# Patient Record
Sex: MALE | Race: WHITE | NOT HISPANIC OR LATINO
[De-identification: names, ages, dates, MRNs, and addresses within clinical notes are randomized per-mention and may not be internally consistent; named-entity substitution may affect disease eponyms.]

---

## 2021-05-26 ENCOUNTER — RESULT REVIEW (OUTPATIENT)
Age: 61
End: 2021-05-26

## 2022-05-05 PROBLEM — Z00.00 ENCOUNTER FOR PREVENTIVE HEALTH EXAMINATION: Status: ACTIVE | Noted: 2022-05-05

## 2022-06-09 ENCOUNTER — APPOINTMENT (OUTPATIENT)
Dept: ORTHOPEDIC SURGERY | Facility: CLINIC | Age: 62
End: 2022-06-09

## 2022-06-22 ENCOUNTER — APPOINTMENT (OUTPATIENT)
Dept: ORTHOPEDIC SURGERY | Facility: CLINIC | Age: 62
End: 2022-06-22
Payer: COMMERCIAL

## 2022-06-22 VITALS — BODY MASS INDEX: 28.88 KG/M2 | WEIGHT: 195 LBS | HEIGHT: 69 IN

## 2022-06-22 DIAGNOSIS — I51.9 HEART DISEASE, UNSPECIFIED: ICD-10-CM

## 2022-06-22 PROCEDURE — 72170 X-RAY EXAM OF PELVIS: CPT

## 2022-06-22 PROCEDURE — 72110 X-RAY EXAM L-2 SPINE 4/>VWS: CPT

## 2022-06-22 PROCEDURE — 99204 OFFICE O/P NEW MOD 45 MIN: CPT

## 2022-06-22 NOTE — HISTORY OF PRESENT ILLNESS
[Lower back] : lower back [Gradual] : gradual [8] : 8 [7] : 7 [Localized] : localized [Constant] : constant [Household chores] : household chores [Nothing helps with pain getting better] : Nothing helps with pain getting better [Standing] : standing [Walking] : walking [Stairs] : stairs [Lying in bed] : lying in bed [de-identified] : 6/22/22- 62 y/o LHD M presents for lower back pain for years, as well as R groin pain. Denies specific injury. Aggravated by turning over in bed and prolonged sitting/standing/walking. \par \par Denies b/b incontinence.\par Associated pain radiating down posterolateral RLE to ankle. Associated RLE weakness. \par \par Had rigth hip arthroscopy and injectoins without much relief \par \par Has tried physical therapy 2 months ago and 4 LESIs several years ago, without relief. \par Denies prior back/hip surgeries. 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

## 2022-06-22 NOTE — PHYSICAL EXAM
[] : positive equivocal straight leg raise [Right] : right hip [de-identified] : shooting lateral thigh  [FreeTextEntry3] : pain with rotation of the right hip

## 2022-06-22 NOTE — DISCUSSION/SUMMARY
[de-identified] : appears to have grade 1 spondylolisthesis -also with right hip issues - has had hip ATS and without much relief \par \par flexeril \par \par referral to pain for L5-s1 lesi \par \par not interested in surgery - would be a candidate surgery if his heart was okay - but not likely with an ejection fraction in the 20s \par would need cardiac clearance

## 2022-06-22 NOTE — IMAGING
[Facet arthropathy] : Facet arthropathy [Spondylolithesis] : Spondylolithesis [de-identified] : L spine\par Palpation: midline lumbar tenderness and R paraspinal/piriformis tenderness\par ROM: diminished all planes\par Strength: 5/5 throughout RLE.4+/5 throughout LLE\par Sensation: diminished sensation to light touch of RLE above and below the knee compared to LLE\par R groin pain with R hip flex/IR [FreeTextEntry1] : L5-S1 spondylolysthesis

## 2022-06-30 ENCOUNTER — FORM ENCOUNTER (OUTPATIENT)
Age: 62
End: 2022-06-30

## 2022-07-11 ENCOUNTER — FORM ENCOUNTER (OUTPATIENT)
Age: 62
End: 2022-07-11

## 2022-07-11 ENCOUNTER — APPOINTMENT (OUTPATIENT)
Dept: PAIN MANAGEMENT | Facility: CLINIC | Age: 62
End: 2022-07-11

## 2022-07-11 VITALS — BODY MASS INDEX: 29.03 KG/M2 | WEIGHT: 196 LBS | HEIGHT: 69 IN

## 2022-07-11 DIAGNOSIS — M25.551 PAIN IN RIGHT HIP: ICD-10-CM

## 2022-07-11 PROCEDURE — 99204 OFFICE O/P NEW MOD 45 MIN: CPT

## 2022-07-11 RX ORDER — RAMIPRIL 5 MG/1
5 CAPSULE ORAL
Qty: 90 | Refills: 0 | Status: ACTIVE | COMMUNITY
Start: 2022-05-27

## 2022-07-11 RX ORDER — LAMOTRIGINE 25 MG/1
25 TABLET ORAL
Qty: 180 | Refills: 0 | Status: ACTIVE | COMMUNITY
Start: 2022-04-27

## 2022-07-11 RX ORDER — METOPROLOL TARTRATE 50 MG/1
50 TABLET, FILM COATED ORAL
Qty: 180 | Refills: 0 | Status: ACTIVE | COMMUNITY
Start: 2022-04-27

## 2022-07-11 RX ORDER — CLOPIDOGREL BISULFATE 75 MG/1
75 TABLET, FILM COATED ORAL
Qty: 90 | Refills: 0 | Status: ACTIVE | COMMUNITY
Start: 2022-06-14

## 2022-07-11 NOTE — DATA REVIEWED
[CT Scan] : CT scan [Lumbar Spine] : lumbar spine [Report was reviewed and noted in the chart] : The report was reviewed and noted in the chart [I independently reviewed and interpreted images and report] : I independently reviewed and interpreted images and report [I reviewed the films/CD and agree] : I reviewed the films/CD and agree

## 2022-07-11 NOTE — HISTORY OF PRESENT ILLNESS
[Lower back] : lower back [10] : 10 [7] : 7 [Shooting] : shooting [Stabbing] : stabbing [Constant] : constant [Rest] : rest [Sitting] : sitting [Standing] : standing [Walking] : walking [Bending forward] : bending forward [] : no [FreeTextEntry1] : Right hip  [FreeTextEntry7] : right leg and groin  [de-identified] : Xray [facility]

## 2022-07-11 NOTE — ASSESSMENT
[FreeTextEntry1] : After discussing various treatment options with the patient including but not limited to oral medications, physical therapy, exercise, modalities as well as interventional spinal injections, we have decided with the following plan:\par \par 1) Intervention Injection Therapy:\par I personally reviewed the MRI/CT scan images and agree with the radiologist's report. The radiological findings were discussed with the patient.\par The risks, benefits, contents and alternatives to injection were explained in full to the patient. Risks outlined include but are not limited to infection,sepsis, bleeding, post-dural puncture headache, nerve damage, temporary increase in pain, syncopal episode, failure to resolve symptoms, allergic reaction, symptom recurrence, and elevation of blood sugar in diabetics. Cortisone may cause immunosuppression. Patient understands the risks. All questions were answered. After discussion of options, patient requested an injection. Information regarding the injection was given to the patient. Which medications to stop prior to the injection was explained to the patient as well.\par \par Follow up in 1-2 weeks post injection for re-evaluation. \par Continue Home exercises, stretching, activity modification, physical therapy, and conservative care.\par \par Patient is presenting with acute/sub-acute radicular pain with impairment in ADLs and functionality.  The pain has not responded to  conservative care including nsaid therapy and/or physical therapy.  There is no bleeding tendency, unstable medical condition, or systemic infection.\par \par right L5/S1 TFESI\par \par \par \par \par

## 2022-07-11 NOTE — PHYSICAL EXAM
[] : lumbar paraspinal tenderness [3___] : left hip flexion 3[unfilled]/5 [4___] : right hip flexion 4[unfilled]/5 [TWNoteComboBox7] : forward flexion 30 degrees [de-identified] : extension 10 degrees

## 2022-09-13 LAB — SARS-COV-2 N GENE NPH QL NAA+PROBE: NOT DETECTED

## 2022-09-15 ENCOUNTER — APPOINTMENT (OUTPATIENT)
Age: 62
End: 2022-09-15

## 2022-09-15 PROCEDURE — 64483 NJX AA&/STRD TFRM EPI L/S 1: CPT | Mod: RT

## 2022-11-07 ENCOUNTER — NON-APPOINTMENT (OUTPATIENT)
Age: 62
End: 2022-11-07

## 2022-11-20 ENCOUNTER — EMERGENCY (EMERGENCY)
Facility: HOSPITAL | Age: 62
LOS: 1 days | Discharge: HOME | End: 2022-11-20
Attending: EMERGENCY MEDICINE | Admitting: EMERGENCY MEDICINE
Payer: COMMERCIAL

## 2022-11-20 VITALS
HEART RATE: 52 BPM | RESPIRATION RATE: 18 BRPM | SYSTOLIC BLOOD PRESSURE: 126 MMHG | DIASTOLIC BLOOD PRESSURE: 78 MMHG | OXYGEN SATURATION: 98 % | WEIGHT: 197.98 LBS | HEIGHT: 69 IN | TEMPERATURE: 97 F

## 2022-11-20 VITALS
HEART RATE: 65 BPM | TEMPERATURE: 98 F | SYSTOLIC BLOOD PRESSURE: 132 MMHG | OXYGEN SATURATION: 99 % | DIASTOLIC BLOOD PRESSURE: 87 MMHG | RESPIRATION RATE: 18 BRPM

## 2022-11-20 LAB
ALBUMIN SERPL ELPH-MCNC: 3.4 G/DL — SIGNIFICANT CHANGE UP (ref 3.3–5)
ALP SERPL-CCNC: 69 U/L — SIGNIFICANT CHANGE UP (ref 40–120)
ALT FLD-CCNC: 19 U/L — SIGNIFICANT CHANGE UP (ref 12–78)
ANION GAP SERPL CALC-SCNC: 7 MMOL/L — SIGNIFICANT CHANGE UP (ref 5–17)
AST SERPL-CCNC: 12 U/L — LOW (ref 15–37)
BASOPHILS # BLD AUTO: 0.09 K/UL — SIGNIFICANT CHANGE UP (ref 0–0.2)
BASOPHILS NFR BLD AUTO: 1.2 % — SIGNIFICANT CHANGE UP (ref 0–2)
BILIRUB SERPL-MCNC: 0.3 MG/DL — SIGNIFICANT CHANGE UP (ref 0.2–1.2)
BUN SERPL-MCNC: 20 MG/DL — SIGNIFICANT CHANGE UP (ref 7–23)
CALCIUM SERPL-MCNC: 9.1 MG/DL — SIGNIFICANT CHANGE UP (ref 8.5–10.1)
CHLORIDE SERPL-SCNC: 110 MMOL/L — HIGH (ref 96–108)
CO2 SERPL-SCNC: 24 MMOL/L — SIGNIFICANT CHANGE UP (ref 22–31)
CREAT SERPL-MCNC: 0.98 MG/DL — SIGNIFICANT CHANGE UP (ref 0.5–1.3)
EGFR: 88 ML/MIN/1.73M2 — SIGNIFICANT CHANGE UP
EOSINOPHIL # BLD AUTO: 0.54 K/UL — HIGH (ref 0–0.5)
EOSINOPHIL NFR BLD AUTO: 6.9 % — HIGH (ref 0–6)
GLUCOSE SERPL-MCNC: 83 MG/DL — SIGNIFICANT CHANGE UP (ref 70–99)
HCT VFR BLD CALC: 38.3 % — LOW (ref 39–50)
HGB BLD-MCNC: 12.6 G/DL — LOW (ref 13–17)
IMM GRANULOCYTES NFR BLD AUTO: 0.5 % — SIGNIFICANT CHANGE UP (ref 0–0.9)
LYMPHOCYTES # BLD AUTO: 2.48 K/UL — SIGNIFICANT CHANGE UP (ref 1–3.3)
LYMPHOCYTES # BLD AUTO: 31.8 % — SIGNIFICANT CHANGE UP (ref 13–44)
MCHC RBC-ENTMCNC: 28 PG — SIGNIFICANT CHANGE UP (ref 27–34)
MCHC RBC-ENTMCNC: 32.9 GM/DL — SIGNIFICANT CHANGE UP (ref 32–36)
MCV RBC AUTO: 85.1 FL — SIGNIFICANT CHANGE UP (ref 80–100)
MONOCYTES # BLD AUTO: 0.64 K/UL — SIGNIFICANT CHANGE UP (ref 0–0.9)
MONOCYTES NFR BLD AUTO: 8.2 % — SIGNIFICANT CHANGE UP (ref 2–14)
NEUTROPHILS # BLD AUTO: 4.01 K/UL — SIGNIFICANT CHANGE UP (ref 1.8–7.4)
NEUTROPHILS NFR BLD AUTO: 51.4 % — SIGNIFICANT CHANGE UP (ref 43–77)
NRBC # BLD: 0 /100 WBCS — SIGNIFICANT CHANGE UP (ref 0–0)
PLATELET # BLD AUTO: 340 K/UL — SIGNIFICANT CHANGE UP (ref 150–400)
POTASSIUM SERPL-MCNC: 4 MMOL/L — SIGNIFICANT CHANGE UP (ref 3.5–5.3)
POTASSIUM SERPL-SCNC: 4 MMOL/L — SIGNIFICANT CHANGE UP (ref 3.5–5.3)
PROT SERPL-MCNC: 7.7 G/DL — SIGNIFICANT CHANGE UP (ref 6–8.3)
RBC # BLD: 4.5 M/UL — SIGNIFICANT CHANGE UP (ref 4.2–5.8)
RBC # FLD: 15.1 % — HIGH (ref 10.3–14.5)
SODIUM SERPL-SCNC: 141 MMOL/L — SIGNIFICANT CHANGE UP (ref 135–145)
TROPONIN I, HIGH SENSITIVITY RESULT: 10.5 NG/L — SIGNIFICANT CHANGE UP
WBC # BLD: 7.8 K/UL — SIGNIFICANT CHANGE UP (ref 3.8–10.5)
WBC # FLD AUTO: 7.8 K/UL — SIGNIFICANT CHANGE UP (ref 3.8–10.5)

## 2022-11-20 PROCEDURE — 70450 CT HEAD/BRAIN W/O DYE: CPT | Mod: 26,MG

## 2022-11-20 PROCEDURE — 99285 EMERGENCY DEPT VISIT HI MDM: CPT

## 2022-11-20 PROCEDURE — G1004: CPT

## 2022-11-20 PROCEDURE — 71045 X-RAY EXAM CHEST 1 VIEW: CPT | Mod: 26

## 2022-11-20 PROCEDURE — 36415 COLL VENOUS BLD VENIPUNCTURE: CPT

## 2022-11-20 PROCEDURE — 80053 COMPREHEN METABOLIC PANEL: CPT

## 2022-11-20 PROCEDURE — 93005 ELECTROCARDIOGRAM TRACING: CPT

## 2022-11-20 PROCEDURE — 93010 ELECTROCARDIOGRAM REPORT: CPT

## 2022-11-20 PROCEDURE — 71045 X-RAY EXAM CHEST 1 VIEW: CPT

## 2022-11-20 PROCEDURE — 84484 ASSAY OF TROPONIN QUANT: CPT

## 2022-11-20 PROCEDURE — 85025 COMPLETE CBC W/AUTO DIFF WBC: CPT

## 2022-11-20 PROCEDURE — 70450 CT HEAD/BRAIN W/O DYE: CPT | Mod: MG

## 2022-11-20 PROCEDURE — 99285 EMERGENCY DEPT VISIT HI MDM: CPT | Mod: 25

## 2022-11-20 RX ORDER — MECLIZINE HCL 12.5 MG
25 TABLET ORAL ONCE
Refills: 0 | Status: COMPLETED | OUTPATIENT
Start: 2022-11-20 | End: 2022-11-20

## 2022-11-20 RX ORDER — ONDANSETRON 8 MG/1
4 TABLET, FILM COATED ORAL ONCE
Refills: 0 | Status: COMPLETED | OUTPATIENT
Start: 2022-11-20 | End: 2022-11-20

## 2022-11-20 RX ORDER — MECLIZINE HCL 12.5 MG
1 TABLET ORAL
Qty: 12 | Refills: 0
Start: 2022-11-20 | End: 2022-11-22

## 2022-11-20 RX ORDER — MECLIZINE HCL 12.5 MG
25 TABLET ORAL ONCE
Refills: 0 | Status: DISCONTINUED | OUTPATIENT
Start: 2022-11-20 | End: 2022-11-24

## 2022-11-20 RX ORDER — ACETAMINOPHEN 500 MG
650 TABLET ORAL ONCE
Refills: 0 | Status: COMPLETED | OUTPATIENT
Start: 2022-11-20 | End: 2022-11-20

## 2022-11-20 RX ORDER — CIPROFLOXACIN AND DEXAMETHASONE 3; 1 MG/ML; MG/ML
4 SUSPENSION/ DROPS AURICULAR (OTIC)
Qty: 7.5 | Refills: 0
Start: 2022-11-20 | End: 2022-11-26

## 2022-11-20 RX ORDER — SODIUM CHLORIDE 9 MG/ML
1000 INJECTION INTRAMUSCULAR; INTRAVENOUS; SUBCUTANEOUS ONCE
Refills: 0 | Status: COMPLETED | OUTPATIENT
Start: 2022-11-20 | End: 2022-11-20

## 2022-11-20 RX ADMIN — Medication 650 MILLIGRAM(S): at 19:31

## 2022-11-20 RX ADMIN — Medication 25 MILLIGRAM(S): at 19:30

## 2022-11-20 NOTE — ED PROVIDER NOTE - NEURO NEGATIVE STATEMENT, MLM
no loss of consciousness, no gait abnormality, no headache, no sensory deficits, and no weakness. ++ spinning, vertiginous dizziness.

## 2022-11-20 NOTE — ED PROVIDER NOTE - NSFOLLOWUPINSTRUCTIONS_ED_ALL_ED_FT
Vertigo      Vertigo is the feeling that you or the things around you are moving when they are not. This feeling can come and go at any time. Vertigo often goes away on its own. This condition can be dangerous if it happens when you are doing activities like driving or working with machines.    Your doctor will do tests to find the cause of your vertigo. These tests will also help your doctor decide on the best treatment for you.      Follow these instructions at home:      Eating and drinking       A comparison of three sample cups showing dark yellow, yellow, and pale yellow urine.       A sign showing that a person should not drink beer, wine, or liquor.     •Drink enough fluid to keep your pee (urine) pale yellow.      • Do not drink alcohol.      Activity     •Return to your normal activities when your doctor says that it is safe.      •In the morning, first sit up on the side of the bed. When you feel okay, stand slowly while you hold onto something until you know that your balance is fine.      •Move slowly. Avoid sudden body or head movements or certain positions, as told by your doctor.      •Use a cane if you have trouble standing or walking.      •Sit down right away if you feel dizzy.      •Avoid doing any tasks or activities that can cause danger to you or others if you get dizzy.      •Avoid bending down if you feel dizzy. Place items in your home so that they are easy for you to reach without bending or leaning over.      • Do not drive or use machinery if you feel dizzy.      General instructions     •Take over-the-counter and prescription medicines only as told by your doctor.      •Keep all follow-up visits.        Contact a doctor if:    •Your medicine does not help your vertigo.      •Your problems get worse or you have new symptoms.      •You have a fever.      •You feel like you may vomit (nauseous), or this feeling gets worse.      •You start to vomit.      •Your family or friends see changes in how you act.      •You lose feeling (have numbness) in part of your body.      •You feel prickling and tingling in a part of your body.        Get help right away if:    •You are always dizzy.      •You faint.      •You get very bad headaches.      •You get a stiff neck.      •Bright light starts to bother you.      •You have trouble moving or talking.      •You feel weak in your hands, arms, or legs.      •You have changes in your hearing or in how you see (vision).      These symptoms may be an emergency. Get help right away. Call your local emergency services (911 in the [place]   • Do not wait to see if the symptoms will go away.        • Do not drive yourself to the hospital.         Summary    •Vertigo is the feeling that you or the things around you are moving when they are not.      •Your doctor will do tests to find the cause of your vertigo.      •You may be told to avoid some tasks, positions, or movements.      •Contact a doctor if your medicine is not helping, or if you have a fever, new symptoms, or a change in how you act.      •Get help right away if you get very bad headaches, or if you have changes in how you speak, hear, or see.      This information is not intended to replace advice given to you by your health care provider. Make sure you discuss any questions you have with your health care provider.      Document Revised: 11/17/2021 Document Reviewed: 11/17/2021    Elsevier Patient Education © 2022 [employer]

## 2022-11-20 NOTE — ED PROVIDER NOTE - ATTENDING APP SHARED VISIT CONTRIBUTION OF CARE
62-year-old male past medical history of MI in 2004 requiring 1 cardiac stent currently on Plavix, aspirin 81 mg, hypertension metoprolol 50 mg and Lamictal, low back pain herniated disks, presents to ER complaining of dizziness.  Patient states that Friday night he went out to dinner and had fish and then started to feel abdominal cramping and nausea in the night, states that he had woken up feeling dizziness, room spinning and continue like that throughout the day on Saturday and today.  Patient states his symptoms are worse when he gets up and turns his head and feels better when he lays down.  Patient awake and alert no acute distress, no facial droop, no focal weakness, coordination intact, able to ambulate without assistance, heart and lungs clear, abdomen soft nontender, no nystagmus, PERRL, EOMI, follow-up CT head, labs, EKG, meclizine, Zofran and reevaluate

## 2022-11-20 NOTE — ED PROVIDER NOTE - PATIENT PORTAL LINK FT
You can access the FollowMyHealth Patient Portal offered by [facility] by registering at the following website: [email or web address]. By joining [facility]’s FollowMyHealth portal, you will also be able to view your health information using other applications (apps) compatible with our system.

## 2022-11-20 NOTE — ED PROVIDER NOTE - ENMT, MLM
Airway patent, Nasal mucosa clear. Mouth with normal mucosa. Throat has no vesicles, no oropharyngeal exudates and uvula is midline. Right ear with otitis externa. Left ear, TM intact.

## 2022-11-20 NOTE — ED ADULT NURSE NOTE - OBJECTIVE STATEMENT
There is fluid in your ear may be as a result of an allergic reaction due to your recent exposure to cats.  This can impair your hearing.    Take medications as prescribed to try to treat the symptoms.    If symptoms persist Monday please call the ENT for the next available appointment   patient comes to ED for evaluation of dizziness patient reports he awoke early this AM he awoke and felt dizzy when he stood up also complains of headache 5/10 denies nausea or vomiting denies head injury no hx of vertigo

## 2022-11-20 NOTE — ED PROVIDER NOTE - OBJECTIVE STATEMENT
61 yo male with PMHx HTN, MI, stents, present to ED c/o spinning dizziness that began Saturday AM. Patient states that Friday night he went out to dinner and ate fish that he believed to be spoiled. Patient had abdominal cramping and nausea in the night.  The next morning he woke with dizziness with associated nausea, no vomiting. Dizziness is worse with movement of head. Relived by rest/time.   Denies current HA, ++ vertiginous type dizziness. Denies weakness.   Denies fever, chills.   Denies sore throat, cough.  Denies CP, denies SOB.   Denies palpitations.   Denies current abdominal pain, ++ nauseas,  Denies v/d/c.    Denies muscle pain, joint pain.   Denies unilateral weakness, numbness or tingling.

## 2022-11-23 ENCOUNTER — APPOINTMENT (OUTPATIENT)
Dept: OTOLARYNGOLOGY | Facility: CLINIC | Age: 62
End: 2022-11-23

## 2022-11-23 VITALS — BODY MASS INDEX: 29.33 KG/M2 | HEIGHT: 69 IN | WEIGHT: 198 LBS

## 2022-11-23 DIAGNOSIS — F17.200 NICOTINE DEPENDENCE, UNSPECIFIED, UNCOMPLICATED: ICD-10-CM

## 2022-11-23 DIAGNOSIS — R40.0 SOMNOLENCE: ICD-10-CM

## 2022-11-23 DIAGNOSIS — I25.2 OLD MYOCARDIAL INFARCTION: ICD-10-CM

## 2022-11-23 DIAGNOSIS — R42 DIZZINESS AND GIDDINESS: ICD-10-CM

## 2022-11-23 DIAGNOSIS — Z86.39 PERSONAL HISTORY OF OTHER ENDOCRINE, NUTRITIONAL AND METABOLIC DISEASE: ICD-10-CM

## 2022-11-23 DIAGNOSIS — H93.13 TINNITUS, BILATERAL: ICD-10-CM

## 2022-11-23 DIAGNOSIS — H69.83 OTHER SPECIFIED DISORDERS OF EUSTACHIAN TUBE, BILATERAL: ICD-10-CM

## 2022-11-23 PROCEDURE — 99204 OFFICE O/P NEW MOD 45 MIN: CPT

## 2022-11-23 PROCEDURE — 92550 TYMPANOMETRY & REFLEX THRESH: CPT

## 2022-11-23 PROCEDURE — 92588 EVOKED AUDITORY TST COMPLETE: CPT

## 2022-11-23 PROCEDURE — 92557 COMPREHENSIVE HEARING TEST: CPT

## 2022-11-23 RX ORDER — IBUPROFEN 600 MG/1
600 TABLET, FILM COATED ORAL
Qty: 42 | Refills: 0 | Status: DISCONTINUED | COMMUNITY
Start: 2022-01-17 | End: 2022-11-23

## 2022-11-23 RX ORDER — FENOFIBRATE,MICRONIZED 67 MG
CAPSULE ORAL
Refills: 0 | Status: ACTIVE | COMMUNITY

## 2022-11-23 RX ORDER — CIPROFLOXACIN AND DEXAMETHASONE 3; 1 MG/ML; MG/ML
0.3-0.1 SUSPENSION/ DROPS AURICULAR (OTIC)
Qty: 8 | Refills: 0 | Status: ACTIVE | COMMUNITY
Start: 2022-11-20

## 2022-11-23 RX ORDER — SIMVASTATIN 40 MG/1
40 TABLET, FILM COATED ORAL
Refills: 0 | Status: ACTIVE | COMMUNITY

## 2022-11-23 RX ORDER — CITALOPRAM HYDROBROMIDE 40 MG/1
40 TABLET, FILM COATED ORAL
Qty: 90 | Refills: 0 | Status: DISCONTINUED | COMMUNITY
Start: 2022-06-14 | End: 2022-11-23

## 2022-11-23 RX ORDER — CYCLOBENZAPRINE HYDROCHLORIDE 10 MG/1
10 TABLET, FILM COATED ORAL
Qty: 90 | Refills: 0 | Status: DISCONTINUED | COMMUNITY
Start: 2022-06-22 | End: 2022-11-23

## 2022-11-23 RX ORDER — BUPROPION HYDROCHLORIDE 150 MG/1
150 TABLET, FILM COATED, EXTENDED RELEASE ORAL
Qty: 60 | Refills: 0 | Status: DISCONTINUED | COMMUNITY
Start: 2022-04-28 | End: 2022-11-23

## 2022-11-23 RX ORDER — DOXYCYCLINE HYCLATE 100 MG/1
100 CAPSULE ORAL
Qty: 14 | Refills: 0 | Status: DISCONTINUED | COMMUNITY
Start: 2022-04-16 | End: 2022-11-23

## 2022-11-23 RX ORDER — PANTOPRAZOLE 40 MG/1
40 TABLET, DELAYED RELEASE ORAL
Qty: 90 | Refills: 0 | Status: DISCONTINUED | COMMUNITY
Start: 2022-04-28 | End: 2022-11-23

## 2022-11-23 RX ORDER — MECLIZINE HYDROCHLORIDE 25 MG/1
25 TABLET ORAL
Qty: 12 | Refills: 0 | Status: ACTIVE | COMMUNITY
Start: 2022-11-20

## 2022-11-23 RX ORDER — FENOFIBRATE 145 MG/1
145 TABLET, COATED ORAL
Qty: 90 | Refills: 0 | Status: DISCONTINUED | COMMUNITY
Start: 2022-06-14 | End: 2022-11-23

## 2022-11-23 RX ORDER — ASPIRIN 81 MG
81 TABLET,CHEWABLE ORAL
Refills: 0 | Status: ACTIVE | COMMUNITY

## 2022-11-23 RX ORDER — EZETIMIBE 10 MG/1
10 TABLET ORAL
Qty: 90 | Refills: 0 | Status: DISCONTINUED | COMMUNITY
Start: 2022-04-27 | End: 2022-11-23

## 2022-11-23 RX ORDER — AZELASTINE HYDROCHLORIDE AND FLUTICASONE PROPIONATE 137; 50 UG/1; UG/1
137-50 SPRAY, METERED NASAL
Qty: 1 | Refills: 5 | Status: ACTIVE | COMMUNITY
Start: 2022-11-23 | End: 1900-01-01

## 2022-11-23 NOTE — DATA REVIEWED
[de-identified] : type A tymps au (etf abnormal au)\par dpoae present 1-5.6khz\par hearing wnl sloping to a mild hf loss at 8khz au

## 2022-11-23 NOTE — REVIEW OF SYSTEMS
[Ear Pain] : ear pain [Dizziness] : dizziness [Vertigo] : vertigo [Recurrent Ear Infections] : recurrent ear infections [Lightheadedness] : lightheadedness [Nasal Congestion] : nasal congestion [Recurrent Sinus Infections] : recurrent sinus infections [Problem Snoring] : problem snoring [Sinus Pain] : sinus pain [Sinus Pressure] : sinus pressure [Swelling Neck] : swelling neck [Chills] : chills [FreeTextEntry2] : fatigue [FreeTextEntry1] : headache

## 2022-11-23 NOTE — CONSULT LETTER
[Dear  ___] : Dear  [unfilled], [Consult Letter:] : I had the pleasure of evaluating your patient, [unfilled]. [Please see my note below.] : Please see my note below. [Consult Closing:] : Thank you very much for allowing me to participate in the care of this patient.  If you have any questions, please do not hesitate to contact me. [Sincerely,] : Sincerely, [FreeTextEntry3] : Augie Sebastian MD FACS

## 2022-11-23 NOTE — ASSESSMENT
[FreeTextEntry1] : Reviewed and reconciled medications, allergies, PMHx, PSHx, SocHx, FMHx.\par \par c/o dizziness, left side worse than right\par \par Physical Exam\par - deviated septum, mildly inflamed turbs \par - midline tender over frontals, tender over maxillary r>l\par - negative romberg but drops his whole body, no particular direction\par \par CT head 11/20/22\par sinuses clear\par some ethmoid disease, thickening in maxillary\par internal carotid arteries calcification\par \par Audio:\par type A tymps au (etf abnormal au)\par dpoae present 1-5.6khz\par hearing wnl sloping to a mild hf loss at 8khz au\par right: 100% discrim at 60db, TPP -8\par left: 100% discrim at 55db, TPP -18\par \par Plan:\par Discussed previous CT head. Audio - results interpreted by Dr. Sebastian and reviewed with the patient. VNG ordered. Afrin 30 minutes before flying. Dymista - 1 spray bilaterally BID, spray laterally. FU after VNG.

## 2022-11-23 NOTE — HISTORY OF PRESENT ILLNESS
[de-identified] : Pt presents today c/o dizziness. Pt notes 4 days ago he went out for dinner and had fish. pt notes he went to bed that night and woke up in the middle of the night and couldn't get up because he felt dizzy. Pt notes he didn't experience any stomach pain or nausea. Pt notes he felt very dizzy the days after. Pt ntoes he went to [facility] emergency room and they told him nothing is wrong with his head. Pt notes they said he had an ear infection and vertigo and was given Ciprodex and meclizine. pt notes symptoms improved for a little bit the first time he took it, but his symptoms have worsened since then. Pt notes he feels more dizzy towards the left. Pt notes he has been very tired. Pt notes he feels something inside his right ear, since he put the drops in. Pt notes during the night when he turns in bed and when he gets up to go to the bathroom he feels dizzy. Pt notes tinnitus which has started since dizziness started in both ears.

## 2022-11-23 NOTE — PHYSICAL EXAM
[Faustin Test Lateralizes To Right] : tone lateralization to the right [Midline] : trachea located in midline position [Removed] : palatine tonsils previously removed [Normal] : extraocular movements are normal [de-identified] : deviated septum  [de-identified] : mildly inflamed turbs  [] : Romberg test is negative [FreeTextEntry2] : midline tender over frontals, tender over maxillary r>l [de-identified] : FARIHA [de-identified] : no nystagmus [de-identified] : negative horizontal and vertical head roll [de-identified] : drops his whole body, no particular direction

## 2022-11-23 NOTE — ADDENDUM
[FreeTextEntry1] : Documented by Beth Calvert acting as scribe for Dr. Sebastian on 11/23/2022.\par \par All Medical record entries made by the scribe were at my, Dr. Sebastian,direction and personally dictated by me on 11/23/2022. I have reviewed the chart and agree that the record accurately reflects my personal performance of the history, physical exam, assessment and plan. I have also personally directed, reviewed, and agreed with the discharge instructions.

## 2022-12-02 ENCOUNTER — NON-APPOINTMENT (OUTPATIENT)
Age: 62
End: 2022-12-02

## 2022-12-02 ENCOUNTER — APPOINTMENT (OUTPATIENT)
Dept: OTOLARYNGOLOGY | Facility: CLINIC | Age: 62
End: 2022-12-02

## 2022-12-02 PROCEDURE — 92537 CALORIC VSTBLR TEST W/REC: CPT

## 2022-12-02 PROCEDURE — 92540 BASIC VESTIBULAR EVALUATION: CPT

## 2022-12-06 ENCOUNTER — APPOINTMENT (OUTPATIENT)
Dept: OTOLARYNGOLOGY | Facility: CLINIC | Age: 62
End: 2022-12-06

## 2022-12-06 VITALS
BODY MASS INDEX: 29.33 KG/M2 | DIASTOLIC BLOOD PRESSURE: 74 MMHG | HEIGHT: 69 IN | HEART RATE: 60 BPM | SYSTOLIC BLOOD PRESSURE: 110 MMHG | WEIGHT: 198 LBS

## 2022-12-06 DIAGNOSIS — J32.2 CHRONIC ETHMOIDAL SINUSITIS: ICD-10-CM

## 2022-12-06 DIAGNOSIS — J34.89 OTHER SPECIFIED DISORDERS OF NOSE AND NASAL SINUSES: ICD-10-CM

## 2022-12-06 DIAGNOSIS — J31.0 CHRONIC RHINITIS: ICD-10-CM

## 2022-12-06 DIAGNOSIS — J34.2 DEVIATED NASAL SEPTUM: ICD-10-CM

## 2022-12-06 PROCEDURE — 99214 OFFICE O/P EST MOD 30 MIN: CPT

## 2022-12-06 NOTE — HISTORY OF PRESENT ILLNESS
[de-identified] : The patient presents with h/o dizziness, chronic rhinitis, b/l tinnitus, ETD b/l. Pt presents today for his VNG results. Pt reports that his dizziness is improving. Pt reports that he only gets slightly dizzy from getting up now, and is fine when turning in bed. Pt states that he has difficulty breathing through his nose, with minimal relief with use of Flovent and Astelin. Adds that he is using the two sprays because his pharmacy was out of [facility] and did not want to wait. Pt adds he feels stress and strain on his heart when he can't breath through his nose. \par

## 2022-12-06 NOTE — PHYSICAL EXAM
[Normal] : no abnormal secretions [de-identified] : deviated septum R>L. b/l positive Medina maneuver  [de-identified] : inflamed [FreeTextEntry2] : mild frontal discomfort, ethmoid, maxillaries discomfort

## 2022-12-06 NOTE — ADDENDUM
[FreeTextEntry1] : Documented by Jairon Pope acting as scribe for Dr. Sebastian on 12/06/2022.\par \par All Medical record entries made by the Scribe were at my, Dr. Sebastian, direction and personally dictated by me on 12/06/2022. I have reviewed the chart and agree that the record accurately reflects my personal performance of the history, physical exam, assessment and plan. I have also personally directed, reviewed, and agreed with the discharge instructions.

## 2022-12-06 NOTE — ASSESSMENT
[FreeTextEntry1] : Reviewed and reconciled medications, allergies, PMHx, PSHx, SocHx, FMHx. \par \par h/o dizziness, chronic rhinitis, b/l tinnitus, ETD b/l\par \par CT head 11/20/22\par sinuses clear\par scattered ethmoid disease, mild mucosal thickening in maxillary. no polyps appreciated \par internal carotid arteries calcification\par \par Audio 11/23/22:\par type A tymps au (etf abnormal au)\par dpoae present 1-5.6khz\par hearing wnl sloping to a mild hf loss at 8khz au\par right: 100% discrim at 60db, TPP -8\par left: 100% discrim at 55db, TPP -18\par \par VNG 12/2/22: significant unilateral weakness of 24% in the left ear \par \par Plan:\par Reviewed VNG results. If symptoms worsen then will send pt to vestibular therapy. Over 50% of the visit spent in discussion. Discussed r/b/a of septum, turbs, ethmoids, and Latera- better to do procedure in hospital rather than ambulatory due to pt's cardiac history. FU 1 month.

## 2022-12-06 NOTE — CONSULT LETTER
[Dear  ___] : Dear  [unfilled], [Courtesy Letter:] : I had the pleasure of seeing your patient, [unfilled], in my office today. [Please see my note below.] : Please see my note below. [Consult Closing:] : Thank you very much for allowing me to participate in the care of this patient.  If you have any questions, please do not hesitate to contact me. [Sincerely,] : Sincerely, [FreeTextEntry3] : Augie Sebastian MD FACS

## 2023-01-04 ENCOUNTER — NON-APPOINTMENT (OUTPATIENT)
Age: 63
End: 2023-01-04

## 2023-02-07 ENCOUNTER — APPOINTMENT (OUTPATIENT)
Dept: ORTHOPEDIC SURGERY | Facility: CLINIC | Age: 63
End: 2023-02-07
Payer: COMMERCIAL

## 2023-02-07 DIAGNOSIS — M70.61 TROCHANTERIC BURSITIS, RIGHT HIP: ICD-10-CM

## 2023-02-07 PROCEDURE — 20610 DRAIN/INJ JOINT/BURSA W/O US: CPT | Mod: RT

## 2023-02-07 PROCEDURE — 99213 OFFICE O/P EST LOW 20 MIN: CPT | Mod: 25

## 2023-02-07 PROCEDURE — 73503 X-RAY EXAM HIP UNI 4/> VIEWS: CPT | Mod: RT

## 2023-02-07 RX ORDER — METHYLPREDNISOLONE 4 MG/1
4 TABLET ORAL
Qty: 1 | Refills: 0 | Status: ACTIVE | COMMUNITY
Start: 2023-02-07 | End: 1900-01-01

## 2023-02-07 NOTE — ASSESSMENT
[FreeTextEntry1] : 61 yo male s/p right hip arthroscopy with partial labral debridement and femoroacetabuloplasty on 5/26/21. Patient had right hip intraarticular csi 1 year ago with 1 week relief of pain. Patient has a pace maker and is unable to have MRI. Patient has PMHx of back issues. Discussed with patient that he should seek further evaluation and management from a spine doctor, patient expressed understanding.\par -Rx Medrol dose veronica given today\par -Patient taking Cyclobenzaprine 5 mg and states that medication is not helping, advised to take 2 tabs and to take at night as medications causes drowsiness, patient expressed understanding\par -Discussed risks, benefits, alternatives of right greater trochanteric hip bursal csi, patient tolerated well\par -Discussed with patient that if his pain worsens that he may be indicated or PELON, patient expressed understanding\par -Activities as tolerated\par -Rest, ice, compression, elevation, NSAIDs PRN for pain. \par -All questions answered\par -F/u 6 weeks\par

## 2023-02-07 NOTE — HISTORY OF PRESENT ILLNESS
[de-identified] : Here today for a follow up right hip. Patient had right hip arthroscopy with partial labral debridement and femoroacetabuloplasty on 5/26/21. Patient states he is having constant pain in the right buttock radiating into the hip and groin. Admits to feeling increased pain when twisting and turning. Admits to taking Tylenol for the pain. Feels he is in need of surgery. Patient states that he had right intraarticular hip CSI a year ago with approximately 1 year of relief of pain.

## 2023-02-07 NOTE — PROCEDURE
[Large Joint Injection] : Large joint injection [Right] : of the right [Greater Trochanteric Bursa] : greater trochanteric bursa [Pain] : pain [Inflammation] : inflammation [Alcohol] : alcohol [___ cc    3mg] :  Betamethasone (Celestone) ~Vcc of 3mg [___ cc    1%] : Lidocaine ~Vcc of 1%  [] : Patient tolerated procedure well [Call if redness, pain or fever occur] : call if redness, pain or fever occur [Apply ice for 15min out of every hour for the next 12-24 hours as tolerated] : apply ice for 15 minutes out of every hour for the next 12-24 hours as tolerated [Previous OTC use and PT nontherapeutic] : patient has tried OTC's including aspirin, Ibuprofen, Aleve, etc or prescription NSAIDS, and/or exercises at home and/or physical therapy without satisfactory response [Patient had decreased mobility in the joint] : patient had decreased mobility in the joint [Risks, benefits, alternatives discussed / Verbal consent obtained] : the risks benefits, and alternatives have been discussed, and verbal consent was obtained

## 2023-02-07 NOTE — PHYSICAL EXAM
[de-identified] : Examination of the right hip is as follows:\par INSPECTION: well healed scars, no swelling, no ecchymosis, no scars, no palpable masses\par PALPATION: groin tenderness, greater trochanter tenderness\par ROM: flexion 100 degrees, abduction 20 degrees, external rotation 40 degrees, internal rotation 30 degrees, limited internal rotation, limited external rotation, limited flexion, limited abduction, but extension 30 degrees, adduction 35 degrees\par STRENGTH: flexion 5/5, extension 5/5, abduction 5/5, adduction 5/5\par VASCULAR: dorsalis pedis pulse: 2+, posterior tibialis pulse: 2+\par NEURO: motor exam 5/5 throughout, light touch intact throughout, no focal motor deficits\par GAIT: antalgic, but patient ambulates without assistive device\par \par X-rays of the right hip is as follows:\par Hip with pelvis 2 view in the anteroposterior, lateral views: Moderate hip oa (Tonnis Grade 2)

## 2023-03-15 ENCOUNTER — APPOINTMENT (OUTPATIENT)
Dept: ORTHOPEDIC SURGERY | Facility: CLINIC | Age: 63
End: 2023-03-15
Payer: COMMERCIAL

## 2023-03-15 VITALS — WEIGHT: 198 LBS | HEIGHT: 69 IN | BODY MASS INDEX: 29.33 KG/M2

## 2023-03-15 DIAGNOSIS — M54.16 RADICULOPATHY, LUMBAR REGION: ICD-10-CM

## 2023-03-15 PROCEDURE — 99214 OFFICE O/P EST MOD 30 MIN: CPT

## 2023-03-15 NOTE — DISCUSSION/SUMMARY
[de-identified] : appears to have grade 1 spondylolisthesis -also with right hip issues - has had hip ATS and without much relief \par Has tried LESI without relief\par has tried gabpaentin/robaxin - makes him drowsy \par indicated for MRi L spine \par fu to review the MRI

## 2023-03-15 NOTE — PHYSICAL EXAM
[] : positive equivocal straight leg raise [Right] : right hip [de-identified] : shooting lateral thigh  [FreeTextEntry3] : pain with rotation of the right hip

## 2023-03-15 NOTE — HISTORY OF PRESENT ILLNESS
[Lower back] : lower back [Gradual] : gradual [8] : 8 [7] : 7 [Localized] : localized [Radiating] : radiating [Constant] : constant [Household chores] : household chores [Nothing helps with pain getting better] : Nothing helps with pain getting better [Standing] : standing [Walking] : walking [Stairs] : stairs [Lying in bed] : lying in bed [de-identified] : 6/22/22- 62 y/o LHD M presents for lower back pain for years, as well as R groin pain. Denies specific injury. Aggravated by turning over in bed and prolonged sitting/standing/walking. \par \par Denies b/b incontinence.\par Associated pain radiating down posterolateral RLE to ankle. Associated RLE weakness. \par \par Had rigth hip arthroscopy and injectoins without much relief \par \par Has tried physical therapy 2 months ago and 4 LESIs several years ago, without relief. \par Denies prior back/hip surgeries. 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 There is no significant spinal canal\par stenosis. There is mild left foraminal narrowing..\par L5-S1: There is diffuse disc bulge and bilateral facet arthropathy. There is bilateral\par lateral recess narrowing. There is mild bilateral foraminal narrowing.\par \par 03/15/2023  here for a follow up on the lower spine ,having pain radiating down both thighs and right lateral thigh. He tried epidurals by dr pritchard with no relief. The groin pain has improved. He has been taking gabapentin  TID but it is making him very tired. He is also taking robaxin at night \par Had GT bursa injection with Dr Quintanilla on 2/7/23 without relief [de-identified] : Dr Zurita  [] : no [de-identified] : physical therapy\par epidural injections

## 2023-03-15 NOTE — IMAGING
[Facet arthropathy] : Facet arthropathy [Spondylolithesis] : Spondylolithesis [FreeTextEntry1] : L5-S1 spondylolysthesis

## 2023-04-27 ENCOUNTER — APPOINTMENT (OUTPATIENT)
Dept: ORTHOPEDIC SURGERY | Facility: CLINIC | Age: 63
End: 2023-04-27

## 2023-05-08 ENCOUNTER — APPOINTMENT (OUTPATIENT)
Dept: ORTHOPEDIC SURGERY | Facility: CLINIC | Age: 63
End: 2023-05-08
Payer: COMMERCIAL

## 2023-05-08 VITALS — WEIGHT: 198 LBS | BODY MASS INDEX: 29.33 KG/M2 | HEIGHT: 69 IN

## 2023-05-08 DIAGNOSIS — M43.16 SPONDYLOLISTHESIS, LUMBAR REGION: ICD-10-CM

## 2023-05-08 DIAGNOSIS — M16.11 UNILATERAL PRIMARY OSTEOARTHRITIS, RIGHT HIP: ICD-10-CM

## 2023-05-08 DIAGNOSIS — M47.816 SPONDYLOSIS W/OUT MYELOPATHY OR RADICULOPATHY, LUMBAR REGION: ICD-10-CM

## 2023-05-08 PROCEDURE — 99215 OFFICE O/P EST HI 40 MIN: CPT

## 2023-05-08 NOTE — HISTORY OF PRESENT ILLNESS
[Lower back] : lower back [Gradual] : gradual [8] : 8 [7] : 7 [Localized] : localized [Radiating] : radiating [Constant] : constant [Household chores] : household chores [Nothing helps with pain getting better] : Nothing helps with pain getting better [Standing] : standing [Walking] : walking [Stairs] : stairs [Lying in bed] : lying in bed [de-identified] : 6/22/22- 60 y/o LHD M presents for lower back pain for years, as well as R groin pain. Denies specific injury. Aggravated by turning over in bed and prolonged sitting/standing/walking. \par \par Denies b/b incontinence.\par Associated pain radiating down posterolateral RLE to ankle. Associated RLE weakness. \par \par Had rigth hip arthroscopy and injectoins without much relief \par \par Has tried physical therapy 2 months ago and 4 LESIs several years ago, without relief. \par Denies prior back/hip surgeries. 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 There is no significant spinal canal\par stenosis. There is mild left foraminal narrowing..\par L5-S1: There is diffuse disc bulge and bilateral facet arthropathy. There is bilateral\par lateral recess narrowing. There is mild bilateral foraminal narrowing.\par \par 03/15/2023  here for a follow up on the lower spine ,having pain radiating down both thighs and right lateral thigh. He tried epidurals by dr pritchard with no relief. The groin pain has improved. He has been taking gabapentin  TID but it is making him very tired. He is also taking robaxin at night \par Had GT bursa injection with Dr Quintanilla on 2/7/23 without relief\par \par 5/8/23: Here for fu - plan at last was "appears to have grade 1 spondylolisthesis -also with right hip issues - has had hip ATS and without much relief \par Has tried LESI without relief\par has tried gabpaentin/robaxin - makes him drowsy \par indicated for MRi L spine \par fu to review the MRI" - overall\par \par he came off the zocor her the \par \par MRi L spine [facility] - degnerative changes LOSSOF DISC AT l5-s1, MODIC CHANGES AT l5-s1  [] : no [FreeTextEntry1] : L Spine  [FreeTextEntry5] : Eduardo is a 62 year M , Here to go over MRI Results  [de-identified] : Dr Zurita  [de-identified] : physical therapy\par epidural injections

## 2023-05-08 NOTE — DISCUSSION/SUMMARY
[de-identified] : reviewed the MRi of the L spine \par discussion of tx optoins  in detail from the least to the most invasive \par \par The patient has tried conservative treatment for this condition including time/activity modification/various medications/therapy/injections without significant relief.  Do not suspect that further conservative treatment will lead to a resolution of symptoms.  Patient remains with persistent activity limited symptoms and is therefore considered for surgical intervention \par \par L5-S1 spondylolisthesis noted on xray with modic changes and loss of disc hieght noted on MRi \par \par discussion of surgery at l5-S1 which would be alif/psf\par \par discussion of sexual dysfunction \par discussino of vascular injury \par \par Hes goign to try one more injections - if that not helpful would rec surgery \par \par

## 2023-06-13 ENCOUNTER — TRANSCRIPTION ENCOUNTER (OUTPATIENT)
Age: 63
End: 2023-06-13

## 2023-06-13 ENCOUNTER — INPATIENT (INPATIENT)
Facility: HOSPITAL | Age: 63
LOS: 0 days | Discharge: HOME | DRG: 92 | End: 2023-06-14
Attending: HOSPITALIST | Admitting: INTERNAL MEDICINE
Payer: COMMERCIAL

## 2023-06-13 VITALS — HEIGHT: 67 IN | WEIGHT: 179.9 LBS

## 2023-06-13 DIAGNOSIS — I25.10 ATHEROSCLEROTIC HEART DISEASE OF NATIVE CORONARY ARTERY WITHOUT ANGINA PECTORIS: ICD-10-CM

## 2023-06-13 DIAGNOSIS — E78.5 HYPERLIPIDEMIA, UNSPECIFIED: ICD-10-CM

## 2023-06-13 DIAGNOSIS — R20.0 ANESTHESIA OF SKIN: ICD-10-CM

## 2023-06-13 DIAGNOSIS — I51.9 HEART DISEASE, UNSPECIFIED: ICD-10-CM

## 2023-06-13 DIAGNOSIS — R20.2 PARESTHESIA OF SKIN: ICD-10-CM

## 2023-06-13 DIAGNOSIS — I10 ESSENTIAL (PRIMARY) HYPERTENSION: ICD-10-CM

## 2023-06-13 LAB
ADD ON TEST-SPECIMEN IN LAB: SIGNIFICANT CHANGE UP
ALBUMIN SERPL ELPH-MCNC: 3.5 G/DL — SIGNIFICANT CHANGE UP (ref 3.3–5)
ALP SERPL-CCNC: 62 U/L — SIGNIFICANT CHANGE UP (ref 40–120)
ALT FLD-CCNC: 14 U/L — SIGNIFICANT CHANGE UP (ref 12–78)
ANION GAP SERPL CALC-SCNC: 6 MMOL/L — SIGNIFICANT CHANGE UP (ref 5–17)
APTT BLD: 32.1 SEC — SIGNIFICANT CHANGE UP (ref 27.5–35.5)
AST SERPL-CCNC: 11 U/L — LOW (ref 15–37)
BASOPHILS # BLD AUTO: 0.08 K/UL — SIGNIFICANT CHANGE UP (ref 0–0.2)
BASOPHILS NFR BLD AUTO: 1 % — SIGNIFICANT CHANGE UP (ref 0–2)
BILIRUB SERPL-MCNC: 0.3 MG/DL — SIGNIFICANT CHANGE UP (ref 0.2–1.2)
BUN SERPL-MCNC: 19 MG/DL — SIGNIFICANT CHANGE UP (ref 7–23)
CALCIUM SERPL-MCNC: 9.1 MG/DL — SIGNIFICANT CHANGE UP (ref 8.5–10.1)
CHLORIDE SERPL-SCNC: 107 MMOL/L — SIGNIFICANT CHANGE UP (ref 96–108)
CO2 SERPL-SCNC: 24 MMOL/L — SIGNIFICANT CHANGE UP (ref 22–31)
CREAT SERPL-MCNC: 1 MG/DL — SIGNIFICANT CHANGE UP (ref 0.5–1.3)
EGFR: 86 ML/MIN/1.73M2 — SIGNIFICANT CHANGE UP
EOSINOPHIL # BLD AUTO: 0.34 K/UL — SIGNIFICANT CHANGE UP (ref 0–0.5)
EOSINOPHIL NFR BLD AUTO: 4.4 % — SIGNIFICANT CHANGE UP (ref 0–6)
GLUCOSE BLDC GLUCOMTR-MCNC: 110 MG/DL — HIGH (ref 70–99)
GLUCOSE BLDC GLUCOMTR-MCNC: 131 MG/DL — HIGH (ref 70–99)
GLUCOSE SERPL-MCNC: 77 MG/DL — SIGNIFICANT CHANGE UP (ref 70–99)
HCT VFR BLD CALC: 37 % — LOW (ref 39–50)
HGB BLD-MCNC: 12.3 G/DL — LOW (ref 13–17)
IMM GRANULOCYTES NFR BLD AUTO: 0.3 % — SIGNIFICANT CHANGE UP (ref 0–0.9)
INR BLD: 1.2 RATIO — HIGH (ref 0.88–1.16)
LYMPHOCYTES # BLD AUTO: 2.28 K/UL — SIGNIFICANT CHANGE UP (ref 1–3.3)
LYMPHOCYTES # BLD AUTO: 29.4 % — SIGNIFICANT CHANGE UP (ref 13–44)
MAGNESIUM SERPL-MCNC: 2 MG/DL — SIGNIFICANT CHANGE UP (ref 1.6–2.6)
MCHC RBC-ENTMCNC: 27.5 PG — SIGNIFICANT CHANGE UP (ref 27–34)
MCHC RBC-ENTMCNC: 33.2 GM/DL — SIGNIFICANT CHANGE UP (ref 32–36)
MCV RBC AUTO: 82.6 FL — SIGNIFICANT CHANGE UP (ref 80–100)
MONOCYTES # BLD AUTO: 0.61 K/UL — SIGNIFICANT CHANGE UP (ref 0–0.9)
MONOCYTES NFR BLD AUTO: 7.9 % — SIGNIFICANT CHANGE UP (ref 2–14)
NEUTROPHILS # BLD AUTO: 4.43 K/UL — SIGNIFICANT CHANGE UP (ref 1.8–7.4)
NEUTROPHILS NFR BLD AUTO: 57 % — SIGNIFICANT CHANGE UP (ref 43–77)
NT-PROBNP SERPL-SCNC: 289 PG/ML — HIGH (ref 0–125)
PLATELET # BLD AUTO: 311 K/UL — SIGNIFICANT CHANGE UP (ref 150–400)
POTASSIUM SERPL-MCNC: 3.6 MMOL/L — SIGNIFICANT CHANGE UP (ref 3.5–5.3)
POTASSIUM SERPL-SCNC: 3.6 MMOL/L — SIGNIFICANT CHANGE UP (ref 3.5–5.3)
PROT SERPL-MCNC: 7.9 GM/DL — SIGNIFICANT CHANGE UP (ref 6–8.3)
PROTHROM AB SERPL-ACNC: 13.9 SEC — HIGH (ref 10.5–13.4)
RAPID RVP RESULT: SIGNIFICANT CHANGE UP
RBC # BLD: 4.48 M/UL — SIGNIFICANT CHANGE UP (ref 4.2–5.8)
RBC # FLD: 14.6 % — HIGH (ref 10.3–14.5)
SARS-COV-2 RNA SPEC QL NAA+PROBE: SIGNIFICANT CHANGE UP
SODIUM SERPL-SCNC: 137 MMOL/L — SIGNIFICANT CHANGE UP (ref 135–145)
TROPONIN I, HIGH SENSITIVITY RESULT: 8.57 NG/L — SIGNIFICANT CHANGE UP
TROPONIN I, HIGH SENSITIVITY RESULT: 9.01 NG/L — SIGNIFICANT CHANGE UP
TROPONIN I, HIGH SENSITIVITY RESULT: 9.85 NG/L — SIGNIFICANT CHANGE UP
WBC # BLD: 7.76 K/UL — SIGNIFICANT CHANGE UP (ref 3.8–10.5)
WBC # FLD AUTO: 7.76 K/UL — SIGNIFICANT CHANGE UP (ref 3.8–10.5)

## 2023-06-13 PROCEDURE — 84443 ASSAY THYROID STIM HORMONE: CPT

## 2023-06-13 PROCEDURE — 82962 GLUCOSE BLOOD TEST: CPT

## 2023-06-13 PROCEDURE — 84100 ASSAY OF PHOSPHORUS: CPT

## 2023-06-13 PROCEDURE — 85027 COMPLETE CBC AUTOMATED: CPT

## 2023-06-13 PROCEDURE — 86803 HEPATITIS C AB TEST: CPT

## 2023-06-13 PROCEDURE — 93005 ELECTROCARDIOGRAM TRACING: CPT

## 2023-06-13 PROCEDURE — 82746 ASSAY OF FOLIC ACID SERUM: CPT

## 2023-06-13 PROCEDURE — 70551 MRI BRAIN STEM W/O DYE: CPT | Mod: 26

## 2023-06-13 PROCEDURE — 84484 ASSAY OF TROPONIN QUANT: CPT

## 2023-06-13 PROCEDURE — 99222 1ST HOSP IP/OBS MODERATE 55: CPT | Mod: GC

## 2023-06-13 PROCEDURE — 92610 EVALUATE SWALLOWING FUNCTION: CPT | Mod: GN

## 2023-06-13 PROCEDURE — 83735 ASSAY OF MAGNESIUM: CPT

## 2023-06-13 PROCEDURE — 70551 MRI BRAIN STEM W/O DYE: CPT

## 2023-06-13 PROCEDURE — 36415 COLL VENOUS BLD VENIPUNCTURE: CPT

## 2023-06-13 PROCEDURE — 71045 X-RAY EXAM CHEST 1 VIEW: CPT | Mod: 26

## 2023-06-13 PROCEDURE — 83880 ASSAY OF NATRIURETIC PEPTIDE: CPT

## 2023-06-13 PROCEDURE — 83036 HEMOGLOBIN GLYCOSYLATED A1C: CPT

## 2023-06-13 PROCEDURE — 99285 EMERGENCY DEPT VISIT HI MDM: CPT

## 2023-06-13 PROCEDURE — 80061 LIPID PANEL: CPT

## 2023-06-13 PROCEDURE — 82607 VITAMIN B-12: CPT

## 2023-06-13 PROCEDURE — 93010 ELECTROCARDIOGRAM REPORT: CPT

## 2023-06-13 PROCEDURE — 99223 1ST HOSP IP/OBS HIGH 75: CPT

## 2023-06-13 PROCEDURE — 93306 TTE W/DOPPLER COMPLETE: CPT

## 2023-06-13 PROCEDURE — 70450 CT HEAD/BRAIN W/O DYE: CPT | Mod: 26

## 2023-06-13 PROCEDURE — 71045 X-RAY EXAM CHEST 1 VIEW: CPT

## 2023-06-13 PROCEDURE — 72141 MRI NECK SPINE W/O DYE: CPT

## 2023-06-13 PROCEDURE — 72141 MRI NECK SPINE W/O DYE: CPT | Mod: 26

## 2023-06-13 PROCEDURE — 99497 ADVNCD CARE PLAN 30 MIN: CPT | Mod: GC,25

## 2023-06-13 PROCEDURE — 70450 CT HEAD/BRAIN W/O DYE: CPT

## 2023-06-13 PROCEDURE — 80048 BASIC METABOLIC PNL TOTAL CA: CPT

## 2023-06-13 PROCEDURE — 92523 SPEECH SOUND LANG COMPREHEN: CPT | Mod: GN

## 2023-06-13 PROCEDURE — 0225U NFCT DS DNA&RNA 21 SARSCOV2: CPT

## 2023-06-13 RX ORDER — ATORVASTATIN CALCIUM 80 MG/1
40 TABLET, FILM COATED ORAL AT BEDTIME
Refills: 0 | Status: DISCONTINUED | OUTPATIENT
Start: 2023-06-13 | End: 2023-06-14

## 2023-06-13 RX ORDER — EZETIMIBE 10 MG/1
1 TABLET ORAL
Refills: 0 | DISCHARGE

## 2023-06-13 RX ORDER — ASPIRIN/CALCIUM CARB/MAGNESIUM 324 MG
1 TABLET ORAL
Refills: 0 | DISCHARGE

## 2023-06-13 RX ORDER — SIMVASTATIN 20 MG/1
1 TABLET, FILM COATED ORAL
Refills: 0 | DISCHARGE

## 2023-06-13 RX ORDER — ACETAMINOPHEN 500 MG
650 TABLET ORAL EVERY 6 HOURS
Refills: 0 | Status: DISCONTINUED | OUTPATIENT
Start: 2023-06-13 | End: 2023-06-14

## 2023-06-13 RX ORDER — LISINOPRIL 2.5 MG/1
20 TABLET ORAL DAILY
Refills: 0 | Status: DISCONTINUED | OUTPATIENT
Start: 2023-06-13 | End: 2023-06-14

## 2023-06-13 RX ORDER — ASPIRIN/CALCIUM CARB/MAGNESIUM 324 MG
81 TABLET ORAL DAILY
Refills: 0 | Status: DISCONTINUED | OUTPATIENT
Start: 2023-06-13 | End: 2023-06-14

## 2023-06-13 RX ORDER — PANTOPRAZOLE SODIUM 20 MG/1
40 TABLET, DELAYED RELEASE ORAL
Refills: 0 | Status: DISCONTINUED | OUTPATIENT
Start: 2023-06-13 | End: 2023-06-14

## 2023-06-13 RX ORDER — LAMOTRIGINE 25 MG/1
1 TABLET, ORALLY DISINTEGRATING ORAL
Refills: 0 | DISCHARGE

## 2023-06-13 RX ORDER — CLOPIDOGREL BISULFATE 75 MG/1
1 TABLET, FILM COATED ORAL
Refills: 0 | DISCHARGE

## 2023-06-13 RX ORDER — CITALOPRAM 10 MG/1
40 TABLET, FILM COATED ORAL DAILY
Refills: 0 | Status: DISCONTINUED | OUTPATIENT
Start: 2023-06-13 | End: 2023-06-14

## 2023-06-13 RX ORDER — CLOPIDOGREL BISULFATE 75 MG/1
75 TABLET, FILM COATED ORAL DAILY
Refills: 0 | Status: DISCONTINUED | OUTPATIENT
Start: 2023-06-13 | End: 2023-06-14

## 2023-06-13 RX ORDER — ONDANSETRON 8 MG/1
4 TABLET, FILM COATED ORAL EVERY 6 HOURS
Refills: 0 | Status: DISCONTINUED | OUTPATIENT
Start: 2023-06-13 | End: 2023-06-14

## 2023-06-13 RX ORDER — PANTOPRAZOLE SODIUM 20 MG/1
1 TABLET, DELAYED RELEASE ORAL
Refills: 0 | DISCHARGE

## 2023-06-13 RX ORDER — LAMOTRIGINE 25 MG/1
25 TABLET, ORALLY DISINTEGRATING ORAL
Refills: 0 | Status: DISCONTINUED | OUTPATIENT
Start: 2023-06-13 | End: 2023-06-14

## 2023-06-13 RX ORDER — CITALOPRAM 10 MG/1
1 TABLET, FILM COATED ORAL
Refills: 0 | DISCHARGE

## 2023-06-13 RX ORDER — FENOFIBRATE,MICRONIZED 130 MG
1 CAPSULE ORAL
Refills: 0 | DISCHARGE

## 2023-06-13 RX ORDER — FENOFIBRATE,MICRONIZED 130 MG
145 CAPSULE ORAL DAILY
Refills: 0 | Status: DISCONTINUED | OUTPATIENT
Start: 2023-06-13 | End: 2023-06-14

## 2023-06-13 RX ORDER — METOPROLOL TARTRATE 50 MG
50 TABLET ORAL
Refills: 0 | Status: DISCONTINUED | OUTPATIENT
Start: 2023-06-13 | End: 2023-06-14

## 2023-06-13 RX ORDER — RAMIPRIL 5 MG
1 CAPSULE ORAL
Refills: 0 | DISCHARGE

## 2023-06-13 RX ORDER — METOPROLOL TARTRATE 50 MG
1 TABLET ORAL
Refills: 0 | DISCHARGE

## 2023-06-13 RX ADMIN — Medication 145 MILLIGRAM(S): at 21:25

## 2023-06-13 RX ADMIN — LISINOPRIL 20 MILLIGRAM(S): 2.5 TABLET ORAL at 10:32

## 2023-06-13 RX ADMIN — ATORVASTATIN CALCIUM 40 MILLIGRAM(S): 80 TABLET, FILM COATED ORAL at 21:25

## 2023-06-13 RX ADMIN — LAMOTRIGINE 25 MILLIGRAM(S): 25 TABLET, ORALLY DISINTEGRATING ORAL at 10:34

## 2023-06-13 RX ADMIN — PANTOPRAZOLE SODIUM 40 MILLIGRAM(S): 20 TABLET, DELAYED RELEASE ORAL at 10:35

## 2023-06-13 RX ADMIN — Medication 50 MILLIGRAM(S): at 10:33

## 2023-06-13 RX ADMIN — Medication 81 MILLIGRAM(S): at 11:27

## 2023-06-13 RX ADMIN — CLOPIDOGREL BISULFATE 75 MILLIGRAM(S): 75 TABLET, FILM COATED ORAL at 21:25

## 2023-06-13 RX ADMIN — CITALOPRAM 40 MILLIGRAM(S): 10 TABLET, FILM COATED ORAL at 10:31

## 2023-06-13 RX ADMIN — LAMOTRIGINE 25 MILLIGRAM(S): 25 TABLET, ORALLY DISINTEGRATING ORAL at 21:25

## 2023-06-13 NOTE — CONSULT NOTE ADULT - PROBLEM SELECTOR RECOMMENDATION 9
likely neurologic vs radiculopathy , doubt cardiac as he is has persistent symptoms since last night , normal troponin , no significant ekg changes to compared to prior ekg though abnormal ,  neurology work up in progress likely neurologic vs radiculopathy , doubt cardiac as he is has persistent symptoms since last night , normal troponin , no significant ekg changes to compared to prior ekg though abnormal ,  neurology work up in progress    spoke to his cardiologist  12:39 PM dr giacomo morales ,  he will do OP stress test

## 2023-06-13 NOTE — CONSULT NOTE ADULT - PROBLEM SELECTOR RECOMMENDATION 5
patient was on zetia , fenofibrate , simvastatin , because of his Body aches , was advised to stop simvastatin zetia , scheduled to have follow up blood work symptoms improved as per patient ,  may be consider [employer] inhbitor as OP by his cardiologist

## 2023-06-13 NOTE — ED ADULT NURSE REASSESSMENT NOTE - NS ED NURSE REASSESS COMMENT FT1
pt is A&O x4. reporting L sided facial and L arm tingling and numbness. reporting symptoms persistent since last night. MD Ramirez at bedside to evaluate pt. report given to 3E and pt pending transport.

## 2023-06-13 NOTE — CONSULT NOTE ADULT - PROBLEM SELECTOR RECOMMENDATION 2
Prior PCI ,  instent stenosis , stent recent 3 to 4 years ago , negative troponin  baseline Prior PCI ,  in stent stenosis , stent recent 3 to 4 years ago , negative troponin  baseline

## 2023-06-13 NOTE — H&P ADULT - HISTORY OF PRESENT ILLNESS
62-year-old male with history of coronary artery disease status post cardiac stents, pacemaker presents for evaluation of sudden onset of left arm numbness and tingling that started in the third fourth and fifth digits currently radiating up the arm to the left anterior neck and jaw that began about 2 hours ago.  Patient states that he was at rest when the symptoms started.  Patient notes that he feels some dizziness but denies shortness of breath.  Patient currently denies chest pain or back pain.  Patient takes 81 mg of aspirin and 75 mg of Plavix daily but took a total of 4 baby aspirin prior to arrival.  Patient is concerned that the symptoms are due to an issue with his heart given his extensive cardiac history.  Patient denies any slurred speech, weakness or facial drooping. 62-year-old male with history of coronary artery disease status post cardiac stents X 2, Cardiomyopathy with EF of 30% S/P Defib/ pacemaker, HLD, HTN presents for evaluation of sudden onset of left arm numbness and tingling that started in the third fourth and fifth digits currently radiating up the arm to the left anterior neck and jaw that began about 2 hours ago.  +left arm weakness and hes normally left handed. Patient states that he was at rest when the symptoms started.  Patient notes that he feels some dizziness but denies shortness of breath or chest pain.  Patient takes 81 mg of aspirin and 75 mg of Plavix daily but took a total of 4 baby aspirin prior to arrival.  Patient is concerned that the symptoms are due to an issue with his heart given his extensive cardiac history.  Patient denies any slurred speech, confusion, headaches or facial drooping.    Patient was seen in the ER, Discussed the case with neuro and cardio and will be ordered stroke workup.     Past Medical History: coronary artery disease status post cardiac stents X 2, Cardiomyopathy with EF of 30% S/P Defib/ pacemaker, HLD, HTN Chronic back pain  Past Surgical History: coronary artery disease status post cardiac stents X 2, Cardiomyopathy with EF of 30% S/P Defib/ pacemaker,  Social History: smokes about  1 1/2 packs pf cigg per day for about 40 years; refused nicotine patch; drinks alcohol occasionally no illicit drug us e  Family History: Father  at 67 from an MI and mother passed away at 82 from Rectal cancer

## 2023-06-13 NOTE — SWALLOW BEDSIDE ASSESSMENT ADULT - SWALLOW EVAL: RECOMMENDED DIET
SUGGEST A REGULAR TEXTURE DIET WITH THIN LIQUIDS AS THE PATIENT APPEARED CLINICALLY TOLERANT OF THESE FOOD TEXTURES FROM AN OROPHARYNGEAL SWALLOWING PERSPECTIVE ON EXAM.

## 2023-06-13 NOTE — DISCHARGE NOTE NURSING/CASE MANAGEMENT/SOCIAL WORK - NSDCPEFALRISK_GEN_ALL_CORE
For information on Fall & Injury Prevention, visit: [email or web address] OR  [email or web address] OR  https://www.cdc.gov/steadi/patient.html

## 2023-06-13 NOTE — CONSULT NOTE ADULT - ASSESSMENT
A/P: 63 yo man presents with left arm subjective heaviness, with left hemisensory deficits.  Stroke is a possible diagnosis, and should be investigated further.  Differential diagnosis includes cervical radiculopathy and left ulnar mono-neuropathy.    Recommend MRI head and neck without contrast.  Pt. has a PPM which needs to be interrogated prior.  Agree with cardiac work up  Continue with ASA and Plavix  Atorvastatin 80, check lipid profile(if LDL <70 on home dose 40mg, may return to home dose)  Hgb A1c within 24 hours  - Permissive HTN- SBP<220, DBP <110, unless cardiology disagrees with parameters   - Neuro checks Q4h  - Vital signs Q4h  - Echo  - PT eval  - Speech and swallow eval  - DVT prophylaxis  - Telemonitoring    Case d/w Dr. Braga   A/P: 61 yo man presents with left arm subjective heaviness, with left upper extremity deficits.  Stroke is a possible diagnosis, and should be investigated further.  Differential diagnosis includes cervical radiculopathy and left ulnar mono-neuropathy.    Recommend MRI head and neck without contrast.  Pt. has a PPM which needs to be interrogated prior.  Agree with cardiac work up  Continue with ASA and Plavix  Atorvastatin 80, check lipid profile(if LDL <70 on home dose 40mg, may return to home dose)  Hgb A1c within 24 hours  - Permissive HTN- SBP<220, DBP <110, unless cardiology disagrees with parameters   - Neuro checks Q4h  - Vital signs Q4h  - Echo  - PT eval  - Speech and swallow eval  - DVT prophylaxis  - Telemonitoring    Case d/w Dr. Braga

## 2023-06-13 NOTE — H&P ADULT - ASSESSMENT
#Left arm Heaviness, Numbness and tingling R/O Stroke R/O ACS  -Admit to tele  -CT head STAT  -symptoms simila to his previous MI  -Discussed with Neurology Dr Wright and will do the stroke work up  -continue ASA, Plavix and statin  -Neuro checks Q4H  -Cardio consult discussed with Dr Palla  -serial trop and ekg    #Cardiomyopathy with EF of 30% S/P Defib/PPM  #Bradycardia  -EP consult  -Repeat TTE     #HLD, HTN  -continue home meds     #Heavy Smoker  -counseled on cessation  -refused Nicotine Patch    #Chronic Lower Back Pain  -continue home meds and tylenol  -Patient does not want any IV pain meds or oxy    #VTE    #Code status  -FULL for now but patient is thinking about DNR and wants to speak with his family

## 2023-06-13 NOTE — ED ADULT NURSE REASSESSMENT NOTE - NS ED NURSE REASSESS COMMENT FT1
Hand off report given to RN Shira.  patient comfortable VSS. wife remains at bedside, call bell in reach.

## 2023-06-13 NOTE — ED ADULT NURSE REASSESSMENT NOTE - NS ED NURSE REASSESS COMMENT FT1
Pt feeling a little SOB, pt 100% on RA, pt has cough and congestion, pt was swabbed. Pt feeling a little SOB and anderson some dizziness MD Padron aware. pt 100% on RA, pt has cough and congestion, pt was swabbed.

## 2023-06-13 NOTE — DISCHARGE NOTE NURSING/CASE MANAGEMENT/SOCIAL WORK - PATIENT PORTAL LINK FT
You can access the FollowMyHealth Patient Portal offered by [facility] by registering at the following website: [email or web address]. By joining [facility] FollowMyHealth portal, you will also be able to view your health information using other applications (apps) compatible with our system.

## 2023-06-13 NOTE — SWALLOW BEDSIDE ASSESSMENT ADULT - SWALLOW EVAL: CRITERIA FOR SKILLED INTERVENTION MET
DO NOT FEEL THAT ACUTE SPEECH PATHOLOGY FOLLOW UP WOULD CHANGE CLINICAL MANAGEMENT/OUTCOME IN ACUTE HOSPITAL SETTING. PT'S SPEECH-LANGUAGE AND OROPHARYNGEAL SWALLOWING INTEGRITY ARE FUNCTIONAL/AT BASELINE/MAXIMIZED. GIVEN ABOVE, THIS SERVICE WILL NOT ACTIVELY FOLLOW. RECONSULT PRN SHOULD STATUS CHANGE AND CONDITION WARRANT.

## 2023-06-13 NOTE — SWALLOW BEDSIDE ASSESSMENT ADULT - SWALLOW EVAL: DIAGNOSIS
1) Pt is alert and interactive. He c/o a left facial tingling sensation which he does not feel interferes with speech/deglutition competence. The pt was alert and interactive. His receptive language abilities were felt to be preserved and he was able to verbalize during communicative probes without evidence of a primary motor speech or primary linguistic pathology. Pt is communicatively competent and feels that he is at speech-language baseline.

## 2023-06-13 NOTE — ED ADULT TRIAGE NOTE - CHIEF COMPLAINT QUOTE
left jaw pain radiating down left arm started 1hour pta. hx heart attack with 2 stents. Denies chest pain

## 2023-06-13 NOTE — ED ADULT NURSE REASSESSMENT NOTE - NS ED NURSE REASSESS COMMENT FT1
Pt taken from Linnea MCDANIEL. Pt is resting comfortably in bed, pt has minor tingling in left arm as before. Pt is sinus marga and [place] aware. Pt given food and a warmed blanket.

## 2023-06-13 NOTE — ED PROVIDER NOTE - CLINICAL SUMMARY MEDICAL DECISION MAKING FREE TEXT BOX
62-year-old male with significant cardiac history presents for evaluation of left arm numbness and tingling without  objective weakness on exam.  Patient does not have any facial drooping or slurred speech.  There is some concern for cardiac cause of the patient's symptoms given that there is discomfort radiating to the patient's neck and jaw.  We will get full cardiac work-up to include EKG, chest x-ray and troponin.  Based on the patient's heart score, the patient will require admission after the first troponin is resulted. 62-year-old male with significant cardiac history presents for evaluation of left arm numbness and tingling without  objective weakness on exam.  Patient does not have any facial drooping or slurred speech.  The paresthesias are likely due to cervical radiculopathy.  objectively, NIHSS is zero. There is some concern for cardiac cause of the patient's symptoms given that there is discomfort radiating to the patient's neck and jaw.  We will get full cardiac work-up to include EKG, chest x-ray and troponin.  Based on the patient's heart score, the patient will require admission after the first troponin is resulted.

## 2023-06-13 NOTE — SWALLOW BEDSIDE ASSESSMENT ADULT - NS SPL SWALLOW CLINIC TRIAL FT
Pt exhibited functional Oropharyngeal Swallowing integrity for age. Bolus formation/transfer were mechanically functional for age and laryngeal lift on palpation during swallow trials was functional for age as well. NO behavioral aspiration signs exhibited. No change in O2 sats noted. Odynophagia denied.

## 2023-06-13 NOTE — H&P ADULT - NSHPREVIEWOFSYSTEMS_GEN_ALL_CORE
REVIEW OF SYSTEMS:    CONSTITUTIONAL: No weakness, fevers or chills  EYES/ENT: No visual changes; vertigo or throat pain   NECK: No pain or stiffness  RESPIRATORY: No cough, wheezing, hemoptysis or shortness of breath  CARDIOVASCULAR: No chest pain or palpitations  GASTROINTESTINAL: No abdominal or epigastric pain. No nausea, vomiting, or hematemesis; No diarrhea or constipation. No melena or hematochezia.  GENITOURINARY: No dysuria, urinary frequency or hematuria  NEUROLOGICAL: No numbness or weakness  EXTREMITIES: No swelling or tenderness  SKIN: No itching, burning, rashes, or lesions   All other review of systems is negative unless indicated above. REVIEW OF SYSTEMS:    CONSTITUTIONAL: No weakness, fevers or chills  EYES/ENT: No visual changes; vertigo or throat pain   NECK: No pain or stiffness  RESPIRATORY: No cough, wheezing, hemoptysis or shortness of breath  CARDIOVASCULAR: No chest pain or palpitations  GASTROINTESTINAL: No abdominal or epigastric pain. No nausea, vomiting, or hematemesis; No diarrhea or constipation. No melena or hematochezia.  GENITOURINARY: No dysuria, urinary frequency or hematuria  NEUROLOGICAL: +left UE numbness, tingline and  weakness  EXTREMITIES: No swelling or tenderness  SKIN: No itching, burning, rashes, or lesions   All other review of systems is negative unless indicated above.

## 2023-06-13 NOTE — SWALLOW BEDSIDE ASSESSMENT ADULT - COMMENTS
The pt was admitted to  with left facial numbness, and LUE tingling/heaviness. Neuro w/u in progress. Dr Muñoz requested speech path consult. The pt has an underlying h/o CAD s/p stent placements, pacemaker placement, and degenerative disc disease in lumbar region.

## 2023-06-13 NOTE — H&P ADULT - NSHPLABSRESULTS_GEN_ALL_CORE
Lab Results:  CBC  CBC Full  -  ( 13 Jun 2023 01:43 )  WBC Count : 7.76 K/uL  RBC Count : 4.48 M/uL  Hemoglobin : 12.3 g/dL  Hematocrit : 37.0 %  Platelet Count - Automated : 311 K/uL  Mean Cell Volume : 82.6 fl  Mean Cell Hemoglobin : 27.5 pg  Mean Cell Hemoglobin Concentration : 33.2 gm/dL  Auto Neutrophil # : 4.43 K/uL  Auto Lymphocyte # : 2.28 K/uL  Auto Monocyte # : 0.61 K/uL  Auto Eosinophil # : 0.34 K/uL  Auto Basophil # : 0.08 K/uL  Auto Neutrophil % : 57.0 %  Auto Lymphocyte % : 29.4 %  Auto Monocyte % : 7.9 %  Auto Eosinophil % : 4.4 %  Auto Basophil % : 1.0 %    .		Differential:	[] Automated		[] Manual  Chemistry                        12.3   7.76  )-----------( 311      ( 13 Jun 2023 01:43 )             37.0     06-13    137  |  107  |  19  ----------------------------<  77  3.6   |  24  |  1.00    Ca    9.1      13 Jun 2023 01:43  Mg     2.0     06-13    TPro  7.9  /  Alb  3.5  /  TBili  0.3  /  DBili  x   /  AST  11<L>  /  ALT  14  /  AlkPhos  62  06-13    LIVER FUNCTIONS - ( 13 Jun 2023 01:43 )  Alb: 3.5 g/dL / Pro: 7.9 gm/dL / ALK PHOS: 62 U/L / ALT: 14 U/L / AST: 11 U/L / GGT: x           PT/INR - ( 13 Jun 2023 01:43 )   PT: 13.9 sec;   INR: 1.20 ratio         PTT - ( 13 Jun 2023 01:43 )  PTT:32.1 sec    RADIOLOGY RESULTS:    EKG Sinus Bradycardia @53 bpm    Prelim CXR no infiltrates or effusion

## 2023-06-13 NOTE — ED ADULT NURSE NOTE - OBJECTIVE STATEMENT
patient is A&Ox3 c/o left jaw and arm pain.  started PTA while playing chess.  patient denies SOB.  patient reports extensive cardiac history of PPM/AICD

## 2023-06-13 NOTE — H&P ADULT - CONVERSATION DETAILS
Advanced Care Planning 35 minutes.    GOC and advance care planning meeting done with the patient. he wishes to be fully resuscitated and mechanically ventilated if need arises. There are no limitations of any sort in his medical care and management for now and he is FULL CODE. but he is thinking about dnr and will let us know once he discusses with his family

## 2023-06-13 NOTE — ED PROVIDER NOTE - OBJECTIVE STATEMENT
62-year-old male with history of coronary artery disease status post cardiac stents, pacemaker presents for evaluation of sudden onset of left arm numbness and tingling that started in the third fourth and fifth digits currently radiating up the arm to the left anterior neck and jaw that began about 2 hours ago.  Patient states that he was at rest when the symptoms started.  Patient notes that he feels some dizziness but denies shortness of breath.  Patient currently denies chest pain or back pain.  Patient takes 81 mg of aspirin and 75 mg of Plavix daily but took a total of 4 baby aspirin prior to arrival.  Patient is concerned that the symptoms are due to an issue with his heart given his extensive cardiac history.  Patient denies any slurred speech, weakness or facial drooping.

## 2023-06-13 NOTE — H&P ADULT - NSHPPHYSICALEXAM_GEN_ALL_CORE
PHYSICAL EXAM:  Constitutional: NAD, awake and alert  Neurological: AAO x 3, no focal deficits  HEENT: PERRLA, EOMI, MMM  Neck: Soft and supple, No LAD, No JVD  Respiratory: Breath sounds are clear bilaterally, No wheezing, rales or rhonchi  Cardiovascular: S1 and S2, regular rate and rhythm; no Murmurs, gallops or rubs  Gastrointestinal: Bowel Sounds present, soft, nontender, nondistended, no guarding, no rebound tenderness  Back: No CVA tenderness   Extremities: No peripheral edema  Vascular: 2+ peripheral pulses  Musculoskeletal: 5/5 strength b/l upper and lower extremities  Skin: No rashes  Breast: Deferred  Rectal: Deferred PHYSICAL EXAM:  Constitutional: NAD, awake and alert  Neurological: AAO x 3, no focal deficits  HEENT: PERRLA, EOMI, MMM  Neck: Soft and supple, No LAD, No JVD  Respiratory: Breath sounds are clear bilaterally, No wheezing, rales or rhonchi  Cardiovascular: S1 and S2, regular rate and rhythm; no Murmurs, gallops or rubs  Gastrointestinal: Bowel Sounds present, soft, nontender, nondistended, no guarding, no rebound tenderness  Back: No CVA tenderness   Extremities: No peripheral edema  Vascular: 2+ peripheral pulses  Musculoskeletal: 4/5 strength in LUE and 5/5 on RUE  5/5 on B/L  lower extremities  Skin: No rashes  Breast: Deferred  Rectal: Deferred Simponi Counseling:  I discussed with the patient the risks of golimumab including but not limited to myelosuppression, immunosuppression, autoimmune hepatitis, demyelinating diseases, lymphoma, and serious infections.  The patient understands that monitoring is required including a PPD at baseline and must alert us or the primary physician if symptoms of infection or other concerning signs are noted.

## 2023-06-13 NOTE — CONSULT NOTE ADULT - SUBJECTIVE AND OBJECTIVE BOX
Patient is a 62y old  Male who presents with a chief complaint of Heaviness, Numbness and tingling of left arm       CC:    61 yo man PMHx of CAD, cardiac stents, PPM, lumbar degenerative disc disease, presented to ED this morning with c/p L 3-5th digit tingling, numbness radiating to L side of neck associated with neck pain, and jaw pain.  Sx's started last night at around 11pm after playing chess for 1 1/2 hours.  Pt. is also reporting proximal L arm weakness.  Pt. denies any slurred speech, impaired coordination.  So far being treated for possible ACS.  Pt. reports compliance with ASA and Plavix.  Denies hx of prior CVA.      Upon evaluation, patient is awake and alert..  Denies any headache, visual changes, changes in speech.  Pt. told nurse he was was experiencing some positional dizziness.    PAST MEDICAL & SURGICAL HISTORY:  CAD (coronary artery disease)  Coronary stents  Pacemaker    FAMILY HISTORY:  Family History: Father  at 67 from an MI and mother passed away at 82 from Rectal cancer    Social Hx:  Nonsmoker, no drug or alcohol use    MEDICATIONS  (STANDING):  aspirin enteric coated 81 milliGRAM(s) Oral daily  atorvastatin 40 milliGRAM(s) Oral at bedtime  citalopram 40 milliGRAM(s) Oral daily  clopidogrel Tablet 75 milliGRAM(s) Oral daily  fenofibrate Tablet 145 milliGRAM(s) Oral daily  lamoTRIgine 25 milliGRAM(s) Oral two times a day  lisinopril 20 milliGRAM(s) Oral daily  metoprolol tartrate 50 milliGRAM(s) Oral two times a day  pantoprazole    Tablet 40 milliGRAM(s) Oral before breakfast       Allergies  No Known Allergies    ROS:   CONSTITUTIONAL: No weakness, fevers or chills  EYES/ENT: No visual changes; vertigo or throat pain   NECK: No pain or stiffness  RESPIRATORY: No cough, wheezing, hemoptysis or shortness of breath  CARDIOVASCULAR: No chest pain or palpitations  GASTROINTESTINAL: No abdominal or epigastric pain. No nausea, vomiting, or hematemesis; No diarrhea or constipation. No melena or hematochezia.  GENITOURINARY: No dysuria, urinary frequency or hematuria  NEUROLOGICAL: +left UE numbness, tingline and  weakness  EXTREMITIES: No swelling or tenderness  SKIN: No itching, burning, rashes, or lesions   All other review of systems is negative unless indicated above.  Other Review of Systems: All other review of systems negative, except as noted in HPI       Vital Signs Last 24 Hrs  T(C): 36.4 (2023 08:57), Max: 36.6 (2023 00:37)  T(F): 97.5 (2023 08:57), Max: 97.8 (2023 00:37)  HR: 52 (2023 08:57) (50 - 67)  BP: 118/66 (2023 08:57) (100/79 - 126/85)  BP(mean): 87 (2023 08:15) (86 - 98)  RR: 18 (2023 08:57) (17 - 18)  SpO2: 100% (2023 08:57) (100% - 100%)    Physical Exam:    General: Normocephalic, No Acute Distress      Constitutional: awake and alert.  HEENT: PERRLA, EOMI,   Neck: Supple.  Respiratory: Breath sounds are clear bilaterally  Cardiovascular: S1 and S2, regular rythm  Gastrointestinal: soft, nontender  Extremities:  no edema  Vascular: No carotid bruit   Musculoskeletal: no joint swelling/tenderness, no abnormal movements  Skin: No rashes    Neurological exam:  HF: A x O x 3. Appropriately interactive, normal affect. Speech fluent, No Aphasia or paraphasic errors. Naming /repetition intact   CN: CHARLEY, EOMI, VFF, decreased facial sensation on left to temperature and light touch, no NLFD, tongue midline, Palate moves equally, SCM equal bilaterally  Motor: No pronator drift, Strength 5/5 in all 4 ext, normal bulk and tone, no tremor, rigidity or bradykinesia.  Guarded L side due to discomfort   Sens: decreased light touch on his left side   Reflexes: Symmetric and normal(no LLE reflexes-patient states this is chronic) . Biceps 2+, BR 2+, Knee 2+(R), Ankle 2+(R), downgoing toes b/l.    Coord:  No FNFA, dysmetria, ROMAN intact   Gait/Balance: Normal    NIHSS: 1    Labs:       137  |  107  |  19  ----------------------------<  77  3.6   |  24  |  1.00    Ca    9.1      2023 01:43  Mg     2.0     06-    TPro  7.9  /  Alb  3.5  /  TBili  0.3  /  DBili  x   /  AST  11<L>  /  ALT  14  /  AlkPhos  62  -                              12.3   7.76  )-----------( 311      ( 2023 01:43 )             37.0       Radiology:  - CT Head:  No acute intracranial hemorrhage or acute territorial infarct.  If   symptoms persist, follow-up MRI exam recommended.                  
    CHIEF COMPLAINT: had left arm numbness heavy feeling since last night  persistent     HPI:  62-year-old male with history of coronary artery disease status post cardiac stents X 2 recent PCI stent ( instent )4 years ago ,, Cardiomyopathy with EF of 30% S/P Defib/ pacemaker, HLD, HTN presents for evaluation of sudden onset of left arm numbness and tingling that started in the third fourth and fifth digits currently radiating up the arm to the left anterior neck and jaw that began around 12 AM .  +left arm weakness and hes normally left handed. persistent , without associated chest pain or shortness of breath , still present this morning ,   Patient states that he was at rest when the symptoms started.  Patient takes 81 mg of aspirin and 75 mg of Plavix daily but took a total of 4 baby aspirin prior to arrival.  Patient is concerned that the symptoms are due to an issue with his heart given his extensive cardiac history.  Patient denies any slurred speech, confusion, headaches or facial drooping. his symptoms completely different from hims prior anginal or heart attack symptoms ,     Patient was seen in the ER, Discussed the case with neuro and cardio and will be ordered stroke workup.     Patient says he stopped taking statin , two months ago as he was body aches with it        PAST MEDICAL & SURGICAL HISTORY:  coronary artery disease status post cardiac stents X 2, Cardiomyopathy with EF of 30% S/P Defib/ pacemaker, HLD, HTN Chronic back pain  CAD PCI 20 years ago instent balloon angipalsty 18 years , stent 4  years ago ,         Allergies    No Known Allergies    Intolerances        SOCIAL HISTORY:  smokes about  1 1/2 packs pf cigg per day for about 40 years; refused nicotine patch; drinks alcohol occasionally no illicit drug us e    FAMILY HISTORY:  Father  at 67 from an MI and mother passed away at 82 from Rectal cancer    MEDICATIONS:Home Medications:  aspirin 81 mg oral delayed release tablet: 1 tab(s) orally once a day (2023 08:24)  citalopram 40 mg oral tablet: 1 tab(s) orally once a day (2023 08:24)  clopidogrel 75 mg oral tablet: 1 tab(s) orally once a day (2023 08:24)  ezetimibe 10 mg oral tablet: 1 tab(s) orally once a day (2023 08:24)  lamoTRIgine 25 mg oral tablet: 1 tab(s) orally 2 times a day (2023 08:24)  Metoprolol Tartrate 50 mg oral tablet: 1 tab(s) orally 2 times a day (2023 08:24)  pantoprazole 40 mg oral delayed release tablet: 1 tab(s) orally once a day (2023 08:24)  ramipril 5 mg oral capsule: 1 cap(s) orally once a day (2023 08:24)  simvastatin 80 mg oral tablet: 1 tab(s) orally every other day (2023 08:24)  TriCor 145 mg oral tablet: 1 tab(s) orally once a day (2023 08:24)    MEDICATIONS  (STANDING):  aspirin enteric coated 81 milliGRAM(s) Oral daily  atorvastatin 40 milliGRAM(s) Oral at bedtime  citalopram 40 milliGRAM(s) Oral daily  clopidogrel Tablet 75 milliGRAM(s) Oral daily  fenofibrate Tablet 145 milliGRAM(s) Oral daily  lamoTRIgine 25 milliGRAM(s) Oral two times a day  lisinopril 20 milliGRAM(s) Oral daily  metoprolol tartrate 50 milliGRAM(s) Oral two times a day  pantoprazole    Tablet 40 milliGRAM(s) Oral before breakfast    MEDICATIONS  (PRN):  acetaminophen     Tablet .. 650 milliGRAM(s) Oral every 6 hours PRN Mild Pain (1 - 3)  ondansetron Injectable 4 milliGRAM(s) IV Push every 6 hours PRN Nausea and/or Vomiting      REVIEW OF SYSTEMS:    CONSTITUTIONAL: No weakness, fevers or chills  EYES/ENT: No visual changes;  No vertigo or throat pain   NECK: No pain or stiffness  RESPIRATORY: No cough, wheezing, hemoptysis; No shortness of breath  CARDIOVASCULAR: No chest pain or palpitations  GASTROINTESTINAL: No abdominal or epigastric pain. No nausea, vomiting, or hematemesis; No diarrhea or constipation. No melena or hematochezia.  GENITOURINARY: No dysuria, frequency or hematuria  NEUROLOGICAL: left arm hand numbness  no weakness   SKIN: No itching, burning, rashes, or lesions   All other review of systems is negative unless indicated above    Vital Signs Last 24 Hrs  T(C): 36.4 (2023 08:57), Max: 36.6 (2023 00:37)  T(F): 97.5 (2023 08:57), Max: 97.8 (2023 00:37)  HR: 52 (2023 08:57) (50 - 67)  BP: 118/66 (2023 08:57) (100/79 - 126/85)  BP(mean): 87 (2023 08:15) (86 - 98)  RR: 18 (2023 08:57) (17 - 18)  SpO2: 100% (2023 08:57) (100% - 100%)    Parameters below as of 2023 08:57  Patient On (Oxygen Delivery Method): room air        I&O's Summary      PHYSICAL EXAM:    Constitutional: NAD, awake and alert, well-developed  HEENT: PERR, EOMI,  No oral cyananosis.  Neck:  supple,  No JVD  Respiratory: Breath sounds are clear bilaterally, No wheezing, rales or rhonchi  Cardiovascular: S1 and S2, regular rate and rhythm, no Murmurs, gallops or rubs  Gastrointestinal: Bowel Sounds present, soft, nontender.   Extremities: No peripheral edema. No clubbing or cyanosis.  Vascular: 2+ peripheral pulses  Neurological: A/O x 3, no focal deficits  Musculoskeletal: no calf tenderness.  Skin: No rashes.      LABS: All Labs Reviewed:                        12.3   7.76  )-----------( 311      ( 2023 01:43 )             37.0     2023 01:43    137    |  107    |  19     ----------------------------<  77     3.6     |  24     |  1.00     Ca    9.1        2023 01:43  Mg     2.0       2023 01:43    TPro  7.9    /  Alb  3.5    /  TBili  0.3    /  DBili  x      /  AST  11     /  ALT  14     /  AlkPhos  62     2023 01:43    PT/INR - ( 2023 01:43 )   PT: 13.9 sec;   INR: 1.20 ratio         PTT - ( 2023 01:43 )  PTT:32.1 sec        Blood Culture:         RADIOLOGY/EKG:    sinus bradycardia , mild anterior T inversion v1 to V3 < from: 12 Lead ECG (23 @ 00:40) >  Poor data quality  Sinus bradycardia with sinus arrhythmia  Septal infarct , age undetermined   T wave abnormality, consider anterior ischemia  Abnormal ECG    < end of copied text >    EKG reviewed  2022 , no significant changes

## 2023-06-13 NOTE — SWALLOW BEDSIDE ASSESSMENT ADULT - SWALLOW EVAL: PROGNOSIS
2) Pt exhibits functional Oropharyngeal Swallowing integrity for age. Bolus formation/transfer were mechanically functional for age and laryngeal lift on palpation during swallow trials was functional for age as well. NO behavioral aspiration signs exhibited. No change in O2 sats noted. Odynophagia denied.

## 2023-06-13 NOTE — CONSULT NOTE ADULT - NS ATTEND AMEND GEN_ALL_CORE FT
62 year old man hx of CAD, c/o left neck pain, left arm numbness, subjective weakness. CT head unremarkable. ? right VA, r/o cervical radiculopathy.  MR head and C-spine  Agree with above

## 2023-06-14 ENCOUNTER — TRANSCRIPTION ENCOUNTER (OUTPATIENT)
Age: 63
End: 2023-06-14

## 2023-06-14 VITALS
SYSTOLIC BLOOD PRESSURE: 109 MMHG | OXYGEN SATURATION: 97 % | DIASTOLIC BLOOD PRESSURE: 63 MMHG | HEART RATE: 56 BPM | RESPIRATION RATE: 18 BRPM | TEMPERATURE: 97 F

## 2023-06-14 LAB
A1C WITH ESTIMATED AVERAGE GLUCOSE RESULT: 6 % — HIGH (ref 4–5.6)
ANION GAP SERPL CALC-SCNC: 5 MMOL/L — SIGNIFICANT CHANGE UP (ref 5–17)
BUN SERPL-MCNC: 25 MG/DL — HIGH (ref 7–23)
CALCIUM SERPL-MCNC: 9.6 MG/DL — SIGNIFICANT CHANGE UP (ref 8.5–10.1)
CHLORIDE SERPL-SCNC: 111 MMOL/L — HIGH (ref 96–108)
CHOLEST SERPL-MCNC: 171 MG/DL — SIGNIFICANT CHANGE UP
CO2 SERPL-SCNC: 24 MMOL/L — SIGNIFICANT CHANGE UP (ref 22–31)
CREAT SERPL-MCNC: 1.07 MG/DL — SIGNIFICANT CHANGE UP (ref 0.5–1.3)
EGFR: 79 ML/MIN/1.73M2 — SIGNIFICANT CHANGE UP
ESTIMATED AVERAGE GLUCOSE: 126 MG/DL — HIGH (ref 68–114)
FOLATE SERPL-MCNC: 9.8 NG/ML — SIGNIFICANT CHANGE UP
GLUCOSE BLDC GLUCOMTR-MCNC: 102 MG/DL — HIGH (ref 70–99)
GLUCOSE SERPL-MCNC: 114 MG/DL — HIGH (ref 70–99)
HCT VFR BLD CALC: 37.9 % — LOW (ref 39–50)
HCV AB S/CO SERPL IA: 0.15 S/CO — SIGNIFICANT CHANGE UP (ref 0–0.99)
HCV AB SERPL-IMP: SIGNIFICANT CHANGE UP
HDLC SERPL-MCNC: 42 MG/DL — SIGNIFICANT CHANGE UP
HGB BLD-MCNC: 12.3 G/DL — LOW (ref 13–17)
LIPID PNL WITH DIRECT LDL SERPL: 109 MG/DL — HIGH
MAGNESIUM SERPL-MCNC: 2.1 MG/DL — SIGNIFICANT CHANGE UP (ref 1.6–2.6)
MCHC RBC-ENTMCNC: 27.3 PG — SIGNIFICANT CHANGE UP (ref 27–34)
MCHC RBC-ENTMCNC: 32.5 GM/DL — SIGNIFICANT CHANGE UP (ref 32–36)
MCV RBC AUTO: 84 FL — SIGNIFICANT CHANGE UP (ref 80–100)
NON HDL CHOLESTEROL: 129 MG/DL — SIGNIFICANT CHANGE UP
PHOSPHATE SERPL-MCNC: 3.7 MG/DL — SIGNIFICANT CHANGE UP (ref 2.5–4.5)
PLATELET # BLD AUTO: 332 K/UL — SIGNIFICANT CHANGE UP (ref 150–400)
POTASSIUM SERPL-MCNC: 4 MMOL/L — SIGNIFICANT CHANGE UP (ref 3.5–5.3)
POTASSIUM SERPL-SCNC: 4 MMOL/L — SIGNIFICANT CHANGE UP (ref 3.5–5.3)
RBC # BLD: 4.51 M/UL — SIGNIFICANT CHANGE UP (ref 4.2–5.8)
RBC # FLD: 14.7 % — HIGH (ref 10.3–14.5)
SODIUM SERPL-SCNC: 140 MMOL/L — SIGNIFICANT CHANGE UP (ref 135–145)
TRIGL SERPL-MCNC: 98 MG/DL — SIGNIFICANT CHANGE UP
TSH SERPL-MCNC: 1.32 UU/ML — SIGNIFICANT CHANGE UP (ref 0.34–4.82)
VIT B12 SERPL-MCNC: 563 PG/ML — SIGNIFICANT CHANGE UP (ref 232–1245)
WBC # BLD: 7.85 K/UL — SIGNIFICANT CHANGE UP (ref 3.8–10.5)
WBC # FLD AUTO: 7.85 K/UL — SIGNIFICANT CHANGE UP (ref 3.8–10.5)

## 2023-06-14 PROCEDURE — 99239 HOSP IP/OBS DSCHRG MGMT >30: CPT

## 2023-06-14 PROCEDURE — 99232 SBSQ HOSP IP/OBS MODERATE 35: CPT

## 2023-06-14 PROCEDURE — 93010 ELECTROCARDIOGRAM REPORT: CPT

## 2023-06-14 RX ADMIN — CLOPIDOGREL BISULFATE 75 MILLIGRAM(S): 75 TABLET, FILM COATED ORAL at 09:16

## 2023-06-14 RX ADMIN — LAMOTRIGINE 25 MILLIGRAM(S): 25 TABLET, ORALLY DISINTEGRATING ORAL at 09:18

## 2023-06-14 RX ADMIN — Medication 100 MILLIGRAM(S): at 01:04

## 2023-06-14 RX ADMIN — CITALOPRAM 40 MILLIGRAM(S): 10 TABLET, FILM COATED ORAL at 09:17

## 2023-06-14 RX ADMIN — Medication 145 MILLIGRAM(S): at 09:16

## 2023-06-14 RX ADMIN — Medication 81 MILLIGRAM(S): at 09:17

## 2023-06-14 RX ADMIN — LISINOPRIL 20 MILLIGRAM(S): 2.5 TABLET ORAL at 09:17

## 2023-06-14 RX ADMIN — PANTOPRAZOLE SODIUM 40 MILLIGRAM(S): 20 TABLET, DELAYED RELEASE ORAL at 05:08

## 2023-06-14 RX ADMIN — Medication 50 MILLIGRAM(S): at 09:17

## 2023-06-14 NOTE — DISCHARGE NOTE PROVIDER - NSDCCAREPROVSEEN_GEN_ALL_CORE_FT
Raj Braga (Neurology)  Palla, Venugopal R (Cardiology)  Venancio Simmons (Hospital Medicine)  Jen Delgado (Cardiac EP)

## 2023-06-14 NOTE — DISCHARGE NOTE PROVIDER - NSDCCPCAREPLAN_GEN_ALL_CORE_FT
PRINCIPAL DISCHARGE DIAGNOSIS  Diagnosis: Paresthesias  Assessment and Plan of Treatment: You were evaluated in the hospital for finger tingling and numbness, pain and numbness on L side of neck. Neurologically, your exam was normal side from diminished sensation to light touch in left upper extremity. Labs were normal. Cardiac blood chemistry normal / negative for acute cardiac condition. Electrolytes normal. Thyroid function normal. B12 level normal. No diabetes by A1c test. EKG without sign of heart attack. ICD cardaic device interrogated. No events. Echocardiogram was without evidence of patent foramen ovale ( a risk for stroke). CT head negative. MRI brain with nonspecific subcortical cerebral hemispheric white matter lesions that could be early microvascular disease versus migraine. No evidence of infarction (stroke). MRI cervical spine with mild cervical degenerative disc disease resulting in primarily in degenerative foraminal compromise. No significant central canal compromise. Appreciate input from Neurology. Advised outpatient follow up with EMG/nerve condition test, follow up with your known S spine surgeon. Primary Care follow up as well.      SECONDARY DISCHARGE DIAGNOSES  Diagnosis: CAD (coronary artery disease)  Assessment and Plan of Treatment: No angina. Troponin negative. Outpatient stress test recommended or at the very least, follow up with your cardiologist. Continue aspirin, statin, cholesterol medication, and other home meds.

## 2023-06-14 NOTE — PROGRESS NOTE ADULT - PROBLEM SELECTOR PLAN 1
PW Numbness to left hand finger tips/L Arm and left side of neck constant with no change in presentation  Echo no evidence of PFO   Troponin negative, EKG Abnormal  no significant change from prior, OPT stress test ( per prior notes )   Possibly cervical radiculopathy Neuro w/u in progress CW DAPT,  doubt cardiac, symptoms persistent, troponin NL, No CP, EKG similar in comparison PW Numbness to left hand finger tips/L Arm and left side of neck constant with no change in presentation  Echo no evidence of PFO   ICD interrogated   Troponin negative, EKG Abnormal  no significant change from prior, OPT stress test ( per prior notes )   Possibly cervical radiculopathy Neuro w/u in progress CW DAPT,  doubt cardiac, symptoms persistent, troponin NL, No CP, EKG similar in comparison

## 2023-06-14 NOTE — PROGRESS NOTE ADULT - PROBLEM SELECTOR PLAN 3
Chronic LV dysfunction   Appears euvolemic/compensated, BNP mildly elevated, no SOB  continue  GDMT as OP ( BP soft )

## 2023-06-14 NOTE — DISCHARGE NOTE PROVIDER - NSDCMRMEDTOKEN_GEN_ALL_CORE_FT
aspirin 81 mg oral delayed release tablet: 1 tab(s) orally once a day  citalopram 40 mg oral tablet: 1 tab(s) orally once a day  clopidogrel 75 mg oral tablet: 1 tab(s) orally once a day  ezetimibe 10 mg oral tablet: 1 tab(s) orally once a day  lamoTRIgine 25 mg oral tablet: 1 tab(s) orally 2 times a day  Metoprolol Tartrate 50 mg oral tablet: 1 tab(s) orally 2 times a day  pantoprazole 40 mg oral delayed release tablet: 1 tab(s) orally once a day  ramipril 5 mg oral capsule: 1 cap(s) orally once a day  simvastatin 80 mg oral tablet: 1 tab(s) orally every other day  TriCor 145 mg oral tablet: 1 tab(s) orally once a day

## 2023-06-14 NOTE — PROGRESS NOTE ADULT - SUBJECTIVE AND OBJECTIVE BOX
CHIEF COMPLAINT: had left arm numbness heavy feeling since last night  persistent     HPI:  62-year-old male with history of coronary artery disease status post cardiac stents X 2 recent PCI stent ( instent )4 years ago ,, Cardiomyopathy with EF of 30% S/P Defib/ pacemaker, HLD, HTN presents for evaluation of sudden onset of left arm numbness and tingling that started in the third fourth and fifth digits currently radiating up the arm to the left anterior neck and jaw that began around 12 AM .  +left arm weakness and hes normally left handed. persistent , without associated chest pain or shortness of breath , still present this morning ,   Patient states that he was at rest when the symptoms started.  Patient takes 81 mg of aspirin and 75 mg of Plavix daily but took a total of 4 baby aspirin prior to arrival.  Patient is concerned that the symptoms are due to an issue with his heart given his extensive cardiac history.  Patient denies any slurred speech, confusion, headaches or facial drooping. his symptoms completely different from hims prior anginal or heart attack symptoms ,     Patient was seen in the ER, Discussed the case with neuro and cardio and will be ordered stroke workup.     Patient says he stopped taking statin , two months ago as he was body aches with it    23 Appears comfortable, speech clear,  no CP, tele SR/SB, Echo HFrEF EF 35-30% No evidence of PFO,       PAST MEDICAL & SURGICAL HISTORY:  coronary artery disease status post cardiac stents X 2, Cardiomyopathy with EF of 30% S/P Defib/ pacemaker, HLD, HTN Chronic back pain  CAD PCI 20 years ago instent balloon angipalsty 18 years , stent 4  years ago ,         Allergies    No Known Allergies    Intolerances        SOCIAL HISTORY:  smokes about  1 1/2 packs pf cigg per day for about 40 years; refused nicotine patch; drinks alcohol occasionally no illicit drug us e    FAMILY HISTORY:  Father  at 67 from an MI and mother passed away at 82 from Rectal cancer    MEDICATIONS  (STANDING):  aspirin enteric coated 81 milliGRAM(s) Oral daily  atorvastatin 40 milliGRAM(s) Oral at bedtime  citalopram 40 milliGRAM(s) Oral daily  clopidogrel Tablet 75 milliGRAM(s) Oral daily  fenofibrate Tablet 145 milliGRAM(s) Oral daily  lamoTRIgine 25 milliGRAM(s) Oral two times a day  lisinopril 20 milliGRAM(s) Oral daily  metoprolol tartrate 50 milliGRAM(s) Oral two times a day  pantoprazole    Tablet 40 milliGRAM(s) Oral before breakfast    MEDICATIONS  (PRN):  acetaminophen     Tablet .. 650 milliGRAM(s) Oral every 6 hours PRN Mild Pain (1 - 3)  benzonatate 100 milliGRAM(s) Oral three times a day PRN Cough  ondansetron Injectable 4 milliGRAM(s) IV Push every 6 hours PRN Nausea and/or Vomiting        Vital Signs Last 24 Hrs  T(C): 36.3 (2023 07:20), Max: 37.1 (2023 14:00)  T(F): 97.3 (2023 07:20), Max: 98.7 (2023 14:00)  HR: 56 (2023 07:20) (52 - 56)  BP: 109/63 (2023 07:20) (102/59 - 114/71)  BP(mean): --  RR: 18 (2023 07:20) (17 - 18)  SpO2: 97% (2023 07:20) (96% - 99%)    Parameters below as of 2023 07:20  Patient On (Oxygen Delivery Method): room air        I&O's Summary      PHYSICAL EXAM:    Constitutional: NAD, awake and alert, well-developed  HEENT: PERR, EOMI,  No oral cyananosis.  Neck:  supple,  No JVD  Respiratory: Breath sounds are clear bilaterally  Cardiovascular: S1 and S2, RRR  Gastrointestinal: Abd soft, nontender.   Extremities: No peripheral edema. No clubbing or cyanosis.  Neurological: A/O x 3,   Musculoskeletal: no calf tenderness.  Skin: No rashes.      LABS: All Labs Reviewed:                        12.3   7.76  )-----------( 311      ( 2023 01:43 )             37.0     2023 01:43    137    |  107    |  19     ----------------------------<  77     3.6     |  24     |  1.00     Ca    9.1        2023 01:43  Mg     2.0       2023 01:43    TPro  7.9    /  Alb  3.5    /  TBili  0.3    /  DBili  x      /  AST  11     /  ALT  14     /  AlkPhos  62     2023 01:43    PT/INR - ( 2023 01:43 )   PT: 13.9 sec;   INR: 1.20 ratio         PTT - ( 2023 01:43 )  PTT:32.1 sec        Blood Culture:         RADIOLOGY/EKG:    sinus bradycardia , mild anterior T inversion v1 to V3 < from: 12 Lead ECG (23 @ 00:40) >  Poor data quality  Sinus bradycardia with sinus arrhythmia  Septal infarct , age undetermined   T wave abnormality, consider anterior ischemia  Abnormal ECG    < end of copied text >    EKG reviewed  2022 , no significant changes         Summary     The left ventricle size is mildly dilated, severe regional hypokinesis,   and moderately reduced function.   Definity was used to better define the endocardial border.   Estimated left ventricular ejection fraction is 35-40 %.   Normal appearing right atrium.   A device wire is seen in the RV and RA.   Mild aortic sclerosis is present with normal valvular opening.   The mitral valve leaflets appear thickened.   Trace mitral regurgitation is present.   The tricuspid valve leaflets are thin and pliable; valve motion is   normal.   Trace tricuspid valve regurgitation is present.     Signature     ----------------------------------------------------------------   Electronically signed by Yves Sanderson MD(Interpreting   physician) on 2023 07:07 PM

## 2023-06-14 NOTE — DISCHARGE NOTE PROVIDER - HOSPITAL COURSE
63 yo man with CAD, PCI, CHFrEF, s/p ICD, lumbar degenerative disc disease, presented to ED with L 3-5th digit tingling, numbness, associated paresthesia and pain on the L side of neck. Started around 11pm 6/12 after playing chess for 1 1/2 hours. Subjectively, he also felt some proximal L arm weakness. No other complaints. No change in speech. No change in vision. No headache. No other areas of numbness or weakness. No chest pain or tightness. No dyspnea. No lightheadedness or palpitations. No other complaints. He took 4 aspirin 81mg tabs and came to the  ED. In the ED, vital signs were normal. Exam significant for decreased sensation to light touch on LUE. Otherwise neurologically intact. NIHSS = 1. CBC, BMP WNL. Ca WNL. Troponin negative. EKG SR, rate ~60, nonspecific T wave abnormality. COVID19 PCR negative. Respiratory pathogens panel negative. CXR clear. CT head with no acute intracranial hemorrhage, parenchymal mass, mass effect or midline shift. There is no acute territorial infarct. There is no hydrocephalus. Partial empty sella noted. Cranium is intact. Patient was placed on tele, ordered for MRI head and C spine and triaged to [facility].     L 3rd through 5th digit paresthesias, associated L neck paresthesia and pain  Mild, persisting. Serial troponin negative. No events on tele. ICD interrogated. No events. Echo no evidence of PFO. No sign of acute coronary syndrome. Ca WNL. TSH WNL. B12 WNL. A1c 6.0. MR head done. Nonspecific subcortical cerebral hemispheric white matter lesions. DDx early manifestation of ischemic white matter disease versus migraine disease. No evidence of infarction. MR cervical spine with mild cervical degenerative disc disease resulting in primarily in degenerative foraminal compromise. No significant central canal compromise. Appreciate input from Neurology. Advised outpatient follow up with EMG, follow up with patient's known S spine surgeon. PCP follow up as well.     CAD  No angina. Troponin negative. Outpatient stress test recommended. Follow up with your cardiologist. Continue aspirin, statin, cholesterol medication.     CHF with reduced EF  Stable. Chronic LV dysfunction. Appears euvolemic/compensated, BNP mildly elevated, no SOB. Continue GDMT as outpatient.     HTN  Controlled. Current medications.    Hyperlipidemia  Prior intolerance to statin, consider PCSK inhibitor, will follow with outpatient cardiologist.        Discharge Exam  T(F): 97.3 (14 Jun 2023 07:20), Max: 97.6 (13 Jun 2023 21:29)  HR: 56 (14 Jun 2023 07:20) (54 - 56)  BP: 109/63 (14 Jun 2023 07:20) (102/59 - 114/71)  RR: 18 (14 Jun 2023 07:20) (17 - 18)  SpO2: 97% (14 Jun 2023 07:20) (96% - 99%) on room air  Constitutional: Awake and alert.  HEENT: PERRLA, EOMI,   Neck: Supple.  Respiratory: Breath sounds are clear bilaterally  Cardiovascular: S1 and S2, regular rhythm  Gastrointestinal: Soft, nontender  Extremities:  No edema  Vascular: No carotid bruit   Musculoskeletal: No joint swelling/tenderness, no abnormal movements  Skin: No rashes  Neuro: A x O x 3. Appropriately interactive, normal affect. Speech fluent. No aphasia or paraphasic errors. Naming /repetition intact. CN WNL. No pronator drift. Strength 5/5 in all 4 ext. Sens decreased light touch on his left side. Reflexes symmetric and normal. Coord no FNFA, dysmetria, ROMAN intact. Gait normal.     Labs:                       12.3   7.85 )-----------( 332             37.9       140  |  111  |  25  -------------------------<  114  4.0   |   24   |  1.07    Ca 9.6   Phos 3.7  Mg 2.1    TPro  7.9  /  Alb  3.5  /  TBili  0.3  /  DBili  x   /  AST  11  /  ALT  14  /  AlkPhos  62      PT: 13.9 sec;   INR: 1.20 ratio  PTT: 32.1 sec    TSH WNL.  B12 WNL.  A1c 6.0.     Troponin negative.     Cardiac Testing:  EKG SR, rate ~60, nonspecific T wave abnormality.     Micro:  COVID19 PCR negative. Respiratory pathogens panel negative.     Imaging:  MR head done. Nonspecific subcortical cerebral hemispheric white matter lesions. DDx early manifestation of ischemic white matter disease versus migraine disease. No evidence of infarction. MR cervical spine with mild cervical degenerative disc disease resulting in primarily in degenerative foraminal compromise. No significant central canal compromise.    CT head with no acute intracranial hemorrhage, parenchymal mass, mass effect or midline shift. There is no acute territorial infarct. There is no hydrocephalus. Partial empty sella noted. Cranium is intact.    CXR clear.

## 2023-06-14 NOTE — DISCHARGE NOTE PROVIDER - CARE PROVIDER_API CALL
Ric Cobb  Internal Medicine  [address] [place] [address]  Phone: [phone]  Fax: [phone]  Follow Up Time: 2 weeks    Your cardiologist,   Phone: (   )    -  Fax: (   )    -  Follow Up Time: 2 weeks    Your [facility] spine surgeon,   Phone: (   )    -  Fax: (   )    -  Follow Up Time: 2 weeks    Raj Braga  Neurology  [address] [facility], [address] [place] [address]  Phone: [phone]  Fax: [phone]  Follow Up Time: 2 weeks

## 2023-06-14 NOTE — DISCHARGE NOTE PROVIDER - PROVIDER TOKENS
PROVIDER:[TOKEN:[[id number]],FOLLOWUP:[2 weeks]],FREE:[LAST:[Your cardiologist],PHONE:[(   )    -],FAX:[(   )    -],FOLLOWUP:[2 weeks]],FREE:[LAST:[Your [facility] spine surgeon],PHONE:[(   )    -],FAX:[(   )    -],FOLLOWUP:[2 weeks]],PROVIDER:[TOKEN:[[id number]],FOLLOWUP:[2 weeks]]

## 2023-06-20 DIAGNOSIS — Z79.02 LONG TERM (CURRENT) USE OF ANTITHROMBOTICS/ANTIPLATELETS: ICD-10-CM

## 2023-06-20 DIAGNOSIS — I42.9 CARDIOMYOPATHY, UNSPECIFIED: ICD-10-CM

## 2023-06-20 DIAGNOSIS — Z79.82 LONG TERM (CURRENT) USE OF ASPIRIN: ICD-10-CM

## 2023-06-20 DIAGNOSIS — Z95.0 PRESENCE OF CARDIAC PACEMAKER: ICD-10-CM

## 2023-06-20 DIAGNOSIS — I25.10 ATHEROSCLEROTIC HEART DISEASE OF NATIVE CORONARY ARTERY WITHOUT ANGINA PECTORIS: ICD-10-CM

## 2023-06-20 DIAGNOSIS — Z95.5 PRESENCE OF CORONARY ANGIOPLASTY IMPLANT AND GRAFT: ICD-10-CM

## 2023-06-20 DIAGNOSIS — I11.0 HYPERTENSIVE HEART DISEASE WITH HEART FAILURE: ICD-10-CM

## 2023-06-20 DIAGNOSIS — E78.5 HYPERLIPIDEMIA, UNSPECIFIED: ICD-10-CM

## 2023-06-20 DIAGNOSIS — R20.2 PARESTHESIA OF SKIN: ICD-10-CM

## 2023-06-20 DIAGNOSIS — G89.29 OTHER CHRONIC PAIN: ICD-10-CM

## 2023-06-20 DIAGNOSIS — I50.22 CHRONIC SYSTOLIC (CONGESTIVE) HEART FAILURE: ICD-10-CM

## 2023-06-20 DIAGNOSIS — M54.50 LOW BACK PAIN, UNSPECIFIED: ICD-10-CM

## 2023-08-21 NOTE — ED ADULT NURSE NOTE - CAS EDN DISCHARGE ASSESSMENT
Addended by: MAURA BAEZ on: 8/21/2023 09:01 AM     Modules accepted: Orders    
Alert and oriented to person, place and time/Patient baseline mental status

## 2023-09-11 PROBLEM — Z95.0 PRESENCE OF CARDIAC PACEMAKER: Chronic | Status: ACTIVE | Noted: 2023-06-13

## 2023-09-11 PROBLEM — I25.10 ATHEROSCLEROTIC HEART DISEASE OF NATIVE CORONARY ARTERY WITHOUT ANGINA PECTORIS: Chronic | Status: ACTIVE | Noted: 2023-06-13

## 2023-09-13 ENCOUNTER — APPOINTMENT (OUTPATIENT)
Dept: ORTHOPEDIC SURGERY | Facility: CLINIC | Age: 63
End: 2023-09-13

## 2024-08-14 ENCOUNTER — APPOINTMENT (OUTPATIENT)
Dept: CARDIOLOGY | Facility: CLINIC | Age: 64
End: 2024-08-14

## 2025-02-17 NOTE — ED ADULT NURSE NOTE - NSFALLUNIVINTERV_ED_ALL_ED
Medication:   Requested Prescriptions     Pending Prescriptions Disp Refills    sertraline (ZOLOFT) 50 MG tablet [Pharmacy Med Name: SERTRALINE HCL 50 MG TABLET 50 Tablet] 90 tablet 0     Sig: TAKE 1 TABLET BY MOUTH DAILY        Last Filled: 08/05/2024      Patient Phone Number: [phone] (home)     Last appt: 1/14/2025   Next appt: 2/24/2025    Last OARRS:        No data to display                  
Bed/Stretcher in lowest position, wheels locked, appropriate side rails in place/Call bell, personal items and telephone in reach/Instruct patient to call for assistance before getting out of bed/chair/stretcher/Non-slip footwear applied when patient is off stretcher/[place] to call system/Physically safe environment - no spills, clutter or unnecessary equipment/Purposeful proactive rounding/Room/bathroom lighting operational, light cord in reach

## 2025-04-29 ENCOUNTER — NON-APPOINTMENT (OUTPATIENT)
Age: 65
End: 2025-04-29

## 2025-04-30 ENCOUNTER — APPOINTMENT (OUTPATIENT)
Dept: OTOLARYNGOLOGY | Facility: CLINIC | Age: 65
End: 2025-04-30
Payer: COMMERCIAL

## 2025-04-30 ENCOUNTER — RESULT REVIEW (OUTPATIENT)
Age: 65
End: 2025-04-30

## 2025-04-30 ENCOUNTER — TRANSCRIPTION ENCOUNTER (OUTPATIENT)
Age: 65
End: 2025-04-30

## 2025-04-30 VITALS
WEIGHT: 211 LBS | SYSTOLIC BLOOD PRESSURE: 102 MMHG | HEIGHT: 69 IN | DIASTOLIC BLOOD PRESSURE: 68 MMHG | BODY MASS INDEX: 31.25 KG/M2 | HEART RATE: 56 BPM

## 2025-04-30 DIAGNOSIS — J34.89 OTHER SPECIFIED DISORDERS OF NOSE AND NASAL SINUSES: ICD-10-CM

## 2025-04-30 DIAGNOSIS — J34.3 HYPERTROPHY OF NASAL TURBINATES: ICD-10-CM

## 2025-04-30 DIAGNOSIS — J31.0 CHRONIC RHINITIS: ICD-10-CM

## 2025-04-30 DIAGNOSIS — J34.829 NASAL VALVE COLLAPSE, UNSPECIFIED: ICD-10-CM

## 2025-04-30 DIAGNOSIS — J34.2 DEVIATED NASAL SEPTUM: ICD-10-CM

## 2025-04-30 DIAGNOSIS — J33.9 NASAL POLYP, UNSPECIFIED: ICD-10-CM

## 2025-04-30 DIAGNOSIS — J32.4 CHRONIC PANSINUSITIS: ICD-10-CM

## 2025-04-30 PROCEDURE — 99214 OFFICE O/P EST MOD 30 MIN: CPT | Mod: 25

## 2025-04-30 PROCEDURE — 31231 NASAL ENDOSCOPY DX: CPT

## 2025-04-30 PROCEDURE — 70486 CT MAXILLOFACIAL W/O DYE: CPT

## 2025-04-30 RX ORDER — EVOLOCUMAB 140 MG/ML
INJECTION, SOLUTION SUBCUTANEOUS
Refills: 0 | Status: ACTIVE | COMMUNITY

## 2025-04-30 RX ORDER — AZELASTINE HYDROCHLORIDE AND FLUTICASONE PROPIONATE 137; 50 UG/1; UG/1
137-50 SPRAY, METERED NASAL
Qty: 1 | Refills: 5 | Status: ACTIVE | COMMUNITY
Start: 2025-04-30 | End: 1900-01-01

## 2025-04-30 RX ORDER — PREDNISONE 10 MG/1
10 TABLET ORAL 3 TIMES DAILY
Qty: 39 | Refills: 0 | Status: ACTIVE | COMMUNITY
Start: 2025-04-30 | End: 1900-01-01